# Patient Record
Sex: FEMALE | Race: WHITE | NOT HISPANIC OR LATINO | Employment: UNEMPLOYED | URBAN - METROPOLITAN AREA
[De-identification: names, ages, dates, MRNs, and addresses within clinical notes are randomized per-mention and may not be internally consistent; named-entity substitution may affect disease eponyms.]

---

## 2019-09-09 ENCOUNTER — OFFICE VISIT (OUTPATIENT)
Dept: OBSTETRICS AND GYNECOLOGY | Facility: CLINIC | Age: 39
End: 2019-09-09

## 2019-09-09 VITALS
BODY MASS INDEX: 29.26 KG/M2 | DIASTOLIC BLOOD PRESSURE: 78 MMHG | SYSTOLIC BLOOD PRESSURE: 110 MMHG | HEIGHT: 72 IN | WEIGHT: 216 LBS

## 2019-09-09 DIAGNOSIS — Z01.419 VISIT FOR GYNECOLOGIC EXAMINATION: ICD-10-CM

## 2019-09-09 DIAGNOSIS — L68.0 FEMALE HIRSUTISM: ICD-10-CM

## 2019-09-09 DIAGNOSIS — N93.8 DYSFUNCTIONAL UTERINE BLEEDING: ICD-10-CM

## 2019-09-09 DIAGNOSIS — Z01.419 PAP TEST, AS PART OF ROUTINE GYNECOLOGICAL EXAMINATION: Primary | ICD-10-CM

## 2019-09-09 PROCEDURE — 99385 PREV VISIT NEW AGE 18-39: CPT | Performed by: OBSTETRICS & GYNECOLOGY

## 2019-09-09 RX ORDER — LEFLUNOMIDE 20 MG/1
20 TABLET ORAL DAILY
Refills: 0 | COMMUNITY
Start: 2019-06-10

## 2019-09-09 RX ORDER — ERYTHROMYCIN 5 MG/G
OINTMENT OPHTHALMIC DAILY
COMMUNITY
Start: 2017-07-05

## 2019-09-09 RX ORDER — VENLAFAXINE HYDROCHLORIDE 150 MG/1
CAPSULE, EXTENDED RELEASE ORAL
Refills: 0 | COMMUNITY
Start: 2019-08-17 | End: 2022-04-29 | Stop reason: SDUPTHER

## 2019-09-09 RX ORDER — ERGOCALCIFEROL 1.25 MG/1
CAPSULE ORAL
COMMUNITY
Start: 2017-05-08

## 2019-09-09 RX ORDER — GABAPENTIN 300 MG/1
CAPSULE ORAL
Refills: 1 | COMMUNITY
Start: 2019-08-29 | End: 2022-04-29 | Stop reason: SDUPTHER

## 2019-09-09 RX ORDER — CLONAZEPAM 1 MG/1
TABLET ORAL
Refills: 0 | COMMUNITY
Start: 2019-08-02

## 2019-09-09 RX ORDER — IBUPROFEN 200 MG
200 TABLET ORAL
COMMUNITY
End: 2022-04-29 | Stop reason: SDUPTHER

## 2019-09-09 RX ORDER — LAMOTRIGINE 200 MG/1
TABLET ORAL
Refills: 1 | COMMUNITY
Start: 2019-08-17

## 2019-09-09 RX ORDER — DEXTROAMPHETAMINE SACCHARATE, AMPHETAMINE ASPARTATE, DEXTROAMPHETAMINE SULFATE AND AMPHETAMINE SULFATE 7.5; 7.5; 7.5; 7.5 MG/1; MG/1; MG/1; MG/1
TABLET ORAL
Refills: 0 | COMMUNITY
Start: 2019-08-18 | End: 2022-04-29 | Stop reason: SDUPTHER

## 2019-09-09 RX ORDER — TOPIRAMATE 100 MG/1
TABLET, FILM COATED ORAL
Refills: 1 | COMMUNITY
Start: 2019-08-17

## 2019-09-09 RX ORDER — LEVOTHYROXINE SODIUM 0.03 MG/1
TABLET ORAL
Refills: 1 | COMMUNITY
Start: 2019-07-14

## 2019-09-09 NOTE — PROGRESS NOTES
"[facility]  [address] [facility], [address]  [place], [place] [address]  Phone: [phone] / Fax:  [phone]      09/09/2019    [address] [facility] [address]    Ángel Esteves MD    Chief Complaint   Patient presents with   • Gynecologic Exam     here for annual exam. Wants to discuss irregular menstrual cycles.  Endocrine dx with PCOS.       Deedee Lang is here for annual gynecologic exam.  HPI - Patient with irregular periods and acne issues.  Her endocrinologist has suggested that she might have PCOS.  She has not had a formal work up.  Patient had tubal ligation for contraception.  Her periods vary and she generally has a period every 1 to 2 months.  She uses 6 pads at most on her heaviest.    History reviewed. No pertinent past medical history.    History reviewed. No pertinent surgical history.    Allergies   Allergen Reactions   • Codeine Nausea Only   • Gluten Meal Other (See Comments)     BLOATING AND DIARRHEA       Social History     Socioeconomic History   • Marital status: Single     Spouse name: Not on file   • Number of children: Not on file   • Years of education: Not on file   • Highest education level: Not on file       History reviewed. No pertinent family history.    Patient's last menstrual period was 08/12/2019 (approximate).    OB History     No data available          Vitals:    09/09/19 1255   BP: 110/78   Weight: 98 kg (216 lb)   Height: 208.3 cm (82\")       Physical Exam   Constitutional: She appears well-developed and well-nourished.   Genitourinary: Vagina normal and uterus normal. Pelvic exam was performed with patient supine. There is no tenderness or lesion on the right labia. There is no tenderness or lesion on the left labia. Right adnexum does not display tenderness and does not display fullness. Left adnexum does not display tenderness and does not display fullness. Cervix does not exhibit motion tenderness or lesion.   HENT:   Right Ear: External ear normal. " "  Left Ear: External ear normal.   Nose: Nose normal.   Eyes: Conjunctivae are normal.   Neck: Normal range of motion. Neck supple. No thyromegaly present.   Cardiovascular: Normal rate, regular rhythm and normal heart sounds.   Pulmonary/Chest: Effort normal. No stridor. She has no wheezes. Right breast exhibits no mass and no nipple discharge. Left breast exhibits no mass and no nipple discharge.   Abdominal: Soft. There is no tenderness. There is no guarding.   Musculoskeletal: Normal range of motion. She exhibits no edema.   Neurological: She is alert. Coordination normal.   Skin: Skin is warm and dry.   Psychiatric: She has a normal mood and affect. Her behavior is normal. Judgment and thought content normal.   Vitals reviewed.      Deedee was seen today for gynecologic exam.    Diagnoses and all orders for this visit:    Pap test, as part of routine gynecological examination/Visit for gynecologic examination  -     IGP, Apt HPV,rfx 16 / 18,45  -     Discussed importance of regular screening and breast awareness.    Dysfunctional uterine bleeding        -     I discussed with patient how diagnosis of PCOS is arrived.  For now, her diagnosis is DUB.  Will do work up.  Given her irregular cycles and \"complexion\" issues, discussed trial of OCP.        -     Patient to do sonogram and labs.      Percy Mcintyre MD      "

## 2019-09-10 LAB
FSH SERPL-ACNC: 4.7 MIU/ML
LH SERPL-ACNC: 10 MIU/ML
PROLACTIN SERPL-MCNC: 39.6 NG/ML (ref 4.8–23.3)
TESTOST SERPL-MCNC: 33 NG/DL (ref 8–48)

## 2019-09-12 ENCOUNTER — TELEPHONE (OUTPATIENT)
Dept: OBSTETRICS AND GYNECOLOGY | Facility: CLINIC | Age: 39
End: 2019-09-12

## 2019-09-12 LAB
CYTOLOGIST CVX/VAG CYTO: NORMAL
CYTOLOGY CVX/VAG DOC CYTO: NORMAL
CYTOLOGY CVX/VAG DOC THIN PREP: NORMAL
DX ICD CODE: NORMAL
HIV 1 & 2 AB SER-IMP: NORMAL
HPV I/H RISK 4 DNA CVX QL PROBE+SIG AMP: NEGATIVE
OTHER STN SPEC: NORMAL
STAT OF ADQ CVX/VAG CYTO-IMP: NORMAL

## 2019-09-12 NOTE — TELEPHONE ENCOUNTER
----- Message from Percy Mcintyre MD sent at 9/11/2019  7:04 PM EDT -----  Jojo - let her know that her labs look normal.  I would like her to do an ultrasound on the day she returns for follow up - September 30th.  Thanks.

## 2019-09-16 ENCOUNTER — TELEPHONE (OUTPATIENT)
Dept: OBSTETRICS AND GYNECOLOGY | Facility: CLINIC | Age: 39
End: 2019-09-16

## 2019-09-16 NOTE — TELEPHONE ENCOUNTER
Left message.                    Left message for patient to call back. 9-16-19/lw  ----- Message from Percy Mcintyre MD sent at 9/12/2019 11:16 AM EDT -----  LAW - Let her know that her pap was normal

## 2019-09-16 NOTE — TELEPHONE ENCOUNTER
Pt called and stated she had discussed with you about going on OCP to help her periods. She said she is bleeding really bad and has some clotting. This is her 3rd day. She said if anything gets called in for her send it to [id number] - [place], [place] - [address] AT [facility] - [phone]  - [phone] FX.       Thank you

## 2019-09-30 ENCOUNTER — PROCEDURE VISIT (OUTPATIENT)
Dept: OBSTETRICS AND GYNECOLOGY | Facility: CLINIC | Age: 39
End: 2019-09-30

## 2019-09-30 ENCOUNTER — OFFICE VISIT (OUTPATIENT)
Dept: OBSTETRICS AND GYNECOLOGY | Facility: CLINIC | Age: 39
End: 2019-09-30

## 2019-09-30 VITALS
WEIGHT: 216 LBS | BODY MASS INDEX: 40.78 KG/M2 | DIASTOLIC BLOOD PRESSURE: 76 MMHG | HEIGHT: 61 IN | SYSTOLIC BLOOD PRESSURE: 118 MMHG

## 2019-09-30 DIAGNOSIS — N93.8 DYSFUNCTIONAL UTERINE BLEEDING: Primary | ICD-10-CM

## 2019-09-30 PROCEDURE — 76830 TRANSVAGINAL US NON-OB: CPT | Performed by: OBSTETRICS & GYNECOLOGY

## 2019-09-30 PROCEDURE — 99213 OFFICE O/P EST LOW 20 MIN: CPT | Performed by: OBSTETRICS & GYNECOLOGY

## 2019-09-30 RX ORDER — NORETHINDRONE ACETATE AND ETHINYL ESTRADIOL 1MG-20(21)
1 KIT ORAL DAILY
Qty: 28 TABLET | Refills: 12 | Status: SHIPPED | OUTPATIENT
Start: 2019-09-30 | End: 2020-09-29

## 2019-09-30 NOTE — PROGRESS NOTES
Subjective   Deedee Lang is a 39 y.o. female.     Cc:  Bleeding and facial hair growth    History of Present Illness - 39 year old female who presents for follow up.  She has irregular bleeding and facial hair growth.  Her labs including CBC, FSH, LH and testosterone were normal.  Ultrasound was normal.  Patient does not have evidence of PCOS.  She is interested in therapeutic interventions.    The following portions of the patient's history were reviewed and updated as appropriate:   She  has no past medical history on file.  She  reports that she has never smoked. She does not have any smokeless tobacco history on file. Her alcohol and drug histories are not on file.  Current Outpatient Medications   Medication Sig Dispense Refill   • amphetamine-dextroamphetamine (ADDERALL) 30 MG tablet   0   • clonazePAM (KlonoPIN) 1 MG tablet TK 1 T PO BID PRN  0   • erythromycin (ROMYCIN) 5 MG/GM ophthalmic ointment Inject  into the eye Daily.     • gabapentin (NEURONTIN) 300 MG capsule   1   • ibuprofen (ADVIL,MOTRIN) 200 MG tablet Take 200 mg by mouth.     • lamoTRIgine (LaMICtal) 200 MG tablet TK 2 TS QAM AND 1 T PO QHS.  1   • leflunomide (ARAVA) 20 MG tablet Take 20 mg by mouth Daily.  0   • levothyroxine (SYNTHROID, LEVOTHROID) 25 MCG tablet TAKE 1 TABLET BY MOUTH QD.  1   • norethindrone-ethinyl estradiol FE (JUNEL FE 1/20) 1-20 MG-MCG per tablet Take 1 tablet by mouth Daily. 28 tablet 12   • topiramate (TOPAMAX) 100 MG tablet TAKE 1 TABLET BY MOUTH BID.  1   • venlafaxine XR (EFFEXOR-XR) 150 MG 24 hr capsule TK 2 CS PO QHS  0   • vitamin D (ERGOCALCIFEROL) 22849 units capsule capsule        No current facility-administered medications for this visit.      She is allergic to codeine and gluten meal..    Review of Systems   Constitutional: Negative for chills and fever.   Genitourinary: Positive for menstrual problem. Negative for pelvic pain.   Skin: Negative for pallor and rash.       Objective   Physical Exam    Constitutional: She appears well-developed and well-nourished.   Psychiatric: She has a normal mood and affect. Her behavior is normal. Judgment and thought content normal.   Vitals reviewed.      Assessment/Plan   Deedee was seen today for follow-up.    Diagnoses and all orders for this visit:    Dysfunctional uterine bleeding  -     norethindrone-ethinyl estradiol FE (JUNEL FE 1/20) 1-20 MG-MCG per tablet; Take 1 tablet by mouth Daily.  -     Patient to regulate cycle with OCP.  This should also help with complexion.  Discussed starting of OCP and compliance.  Follow up in one year.    Percy Mcintyre MD

## 2021-03-31 ENCOUNTER — BULK ORDERING (OUTPATIENT)
Dept: CASE MANAGEMENT | Facility: OTHER | Age: 41
End: 2021-03-31

## 2021-03-31 DIAGNOSIS — Z23 IMMUNIZATION DUE: ICD-10-CM

## 2022-04-29 ENCOUNTER — APPOINTMENT (OUTPATIENT)
Dept: WOMENS IMAGING | Facility: HOSPITAL | Age: 42
End: 2022-04-29

## 2022-04-29 ENCOUNTER — OFFICE VISIT (OUTPATIENT)
Dept: OBSTETRICS AND GYNECOLOGY | Facility: CLINIC | Age: 42
End: 2022-04-29

## 2022-04-29 ENCOUNTER — PROCEDURE VISIT (OUTPATIENT)
Dept: OBSTETRICS AND GYNECOLOGY | Facility: CLINIC | Age: 42
End: 2022-04-29

## 2022-04-29 VITALS
DIASTOLIC BLOOD PRESSURE: 77 MMHG | HEIGHT: 70 IN | WEIGHT: 225 LBS | BODY MASS INDEX: 32.21 KG/M2 | SYSTOLIC BLOOD PRESSURE: 104 MMHG

## 2022-04-29 DIAGNOSIS — Z01.419 VISIT FOR GYNECOLOGIC EXAMINATION: Primary | ICD-10-CM

## 2022-04-29 DIAGNOSIS — N94.6 DYSMENORRHEA: ICD-10-CM

## 2022-04-29 DIAGNOSIS — Z12.31 VISIT FOR SCREENING MAMMOGRAM: Primary | ICD-10-CM

## 2022-04-29 DIAGNOSIS — N92.0 MENORRHAGIA WITH REGULAR CYCLE: ICD-10-CM

## 2022-04-29 PROCEDURE — 77063 BREAST TOMOSYNTHESIS BI: CPT | Performed by: OBSTETRICS & GYNECOLOGY

## 2022-04-29 PROCEDURE — 77063 BREAST TOMOSYNTHESIS BI: CPT | Performed by: RADIOLOGY

## 2022-04-29 PROCEDURE — 77067 SCR MAMMO BI INCL CAD: CPT | Performed by: RADIOLOGY

## 2022-04-29 PROCEDURE — 99212 OFFICE O/P EST SF 10 MIN: CPT | Performed by: OBSTETRICS & GYNECOLOGY

## 2022-04-29 PROCEDURE — 99396 PREV VISIT EST AGE 40-64: CPT | Performed by: OBSTETRICS & GYNECOLOGY

## 2022-04-29 PROCEDURE — 77067 SCR MAMMO BI INCL CAD: CPT | Performed by: OBSTETRICS & GYNECOLOGY

## 2022-04-29 RX ORDER — PILOCARPINE HYDROCHLORIDE 5 MG/1
5 TABLET, FILM COATED ORAL 3 TIMES DAILY
COMMUNITY
Start: 2022-01-31

## 2022-04-29 RX ORDER — IBUPROFEN 200 MG
800 TABLET ORAL EVERY 8 HOURS PRN
Qty: 30 TABLET | Refills: 2 | Status: SHIPPED | OUTPATIENT
Start: 2022-04-29

## 2022-04-29 RX ORDER — DEXTROAMPHETAMINE SACCHARATE, AMPHETAMINE ASPARTATE MONOHYDRATE, DEXTROAMPHETAMINE SULFATE AND AMPHETAMINE SULFATE 7.5; 7.5; 7.5; 7.5 MG/1; MG/1; MG/1; MG/1
30 CAPSULE, EXTENDED RELEASE ORAL 2 TIMES DAILY
COMMUNITY
Start: 2022-02-14

## 2022-04-29 RX ORDER — VENLAFAXINE HYDROCHLORIDE 150 MG/1
150 CAPSULE, EXTENDED RELEASE ORAL DAILY
COMMUNITY

## 2022-04-29 RX ORDER — BUPROPION HYDROCHLORIDE 150 MG/1
150 TABLET ORAL EVERY MORNING
COMMUNITY
Start: 2022-02-21

## 2022-04-29 RX ORDER — VIT C/B6/B5/MAGNESIUM/HERB 173 50-5-6-5MG
CAPSULE ORAL
COMMUNITY

## 2022-04-30 LAB
ERYTHROCYTE [DISTWIDTH] IN BLOOD BY AUTOMATED COUNT: 12.3 % (ref 11.7–15.4)
FSH SERPL-ACNC: 5.7 MIU/ML
HCT VFR BLD AUTO: 42.2 % (ref 34–46.6)
HGB BLD-MCNC: 14.1 G/DL (ref 11.1–15.9)
MCH RBC QN AUTO: 30.2 PG (ref 26.6–33)
MCHC RBC AUTO-ENTMCNC: 33.4 G/DL (ref 31.5–35.7)
MCV RBC AUTO: 90 FL (ref 79–97)
PLATELET # BLD AUTO: 318 X10E3/UL (ref 150–450)
PROLACTIN SERPL-MCNC: 59.4 NG/ML (ref 4.8–23.3)
RBC # BLD AUTO: 4.67 X10E6/UL (ref 3.77–5.28)
TSH SERPL DL<=0.005 MIU/L-ACNC: 2.18 UIU/ML (ref 0.45–4.5)
WBC # BLD AUTO: 6.6 X10E3/UL (ref 3.4–10.8)

## 2022-04-30 NOTE — PROGRESS NOTES
[facility]  [address], [address]  [place], [place] [address]  Phone: [phone] / Fax:  [phone]      2022    [address] [facility] [address]    Aniya Garcia APRN    Chief Complaint   Patient presents with   • Gynecologic Exam      Annual Exam, last pap 19 NL HPV (-). Mammogram today. Patient c/o heavy painful cycles. She is requesting 800 mg of Ibuprofen. She was taking Gabapentin and was not help.          Deedee Lang is here for annual gynecologic exam.  HPI - Patient with last pap 2.5 years ago.  She is undergoing mammogram today.  Patient reports ongoing heavy and painful periods.  She uses 8 pads on heaviest day and soils undergarments.  She tried OCP in past without success but this has been several years ago and for short course.  She has tried NSAIDs and Neurontin for pain.  She has moderate to severe dysmenorrhea; however, heavy periods are her bigger concern.    Past Medical History:   Diagnosis Date   • Abnormal Pap smear of cervix    • ADHD    • Anxiety    • Bipolar disorder (HCC)    • COVID-19 2021   • Depression    • Disease of thyroid gland    • PMDD (premenstrual dysphoric disorder)    • Sjogren's syndrome (HCC)        Past Surgical History:   Procedure Laterality Date   • APPENDECTOMY     • BREAST AUGMENTATION     •  SECTION         Allergies   Allergen Reactions   • Codeine Nausea Only   • Gluten Meal Other (See Comments)     BLOATING AND DIARRHEA       Social History     Socioeconomic History   • Marital status: Single   Tobacco Use   • Smoking status: Never Smoker   Vaping Use   • Vaping Use: Never used   Substance and Sexual Activity   • Alcohol use: Not Currently   • Drug use: Never   • Sexual activity: Yes     Birth control/protection: None       Family History   Problem Relation Age of Onset   • Diabetes Father    • Depression Father    • Depression Mother    • Dystonia Mother    • Bone cancer Paternal Grandfather    • Colon cancer  "Paternal Grandmother    • Muscular dystrophy Maternal Grandmother    • Alzheimer's disease Maternal Grandmother    • Muscular dystrophy Maternal Grandfather        Patient's last menstrual period was 2022 (exact date).    OB History        1    Para        Term                AB        Living           SAB        IAB        Ectopic        Molar        Multiple        Live Births   1                Vitals:    22 1415   BP: 104/77   Weight: 102 kg (225 lb)   Height: 177.8 cm (70\")       Physical Exam  Constitutional:       Appearance: Normal appearance. She is well-developed.   Genitourinary:      Bladder and urethral meatus normal.      Right Labia: No tenderness or lesions.     Left Labia: No tenderness or lesions.     No vaginal discharge or tenderness.        Right Adnexa: not tender and not full.     Left Adnexa: not tender and not full.     No cervical motion tenderness or lesion.      Uterus is not enlarged or tender.      No urethral tenderness or hypermobility present.   Breasts:      Right: No mass or nipple discharge.      Left: No mass or nipple discharge.       HENT:      Right Ear: External ear normal.      Left Ear: External ear normal.      Nose: Nose normal.   Eyes:      Conjunctiva/sclera: Conjunctivae normal.   Neck:      Thyroid: No thyromegaly.   Cardiovascular:      Rate and Rhythm: Normal rate and regular rhythm.      Heart sounds: Normal heart sounds.   Pulmonary:      Effort: Pulmonary effort is normal.      Breath sounds: No stridor. No wheezing.   Abdominal:      Palpations: Abdomen is soft. There is no mass.      Tenderness: There is no guarding or rebound.   Musculoskeletal:         General: Normal range of motion.      Cervical back: Normal range of motion and neck supple.   Neurological:      Mental Status: She is alert.      Coordination: Coordination normal.   Skin:     General: Skin is warm and dry.   Psychiatric:         Mood and Affect: Mood normal.         " Behavior: Behavior normal.         Thought Content: Thought content normal.         Judgment: Judgment normal.   Vitals reviewed. Exam conducted with a chaperone present.         Diagnoses and all orders for this visit:    1. Visit for gynecologic examination (Primary)        -      Discussed importance of regular screening and breast awareness.        -      Patient has been vaccinated against Covid 19.  2. Menorrhagia with regular cycle/Dysmenorrhea  -     US Non-ob Transvaginal; Future  -     CBC (No Diff)  -     TSH  -     Follicle Stimulating Hormone  -     Prolactin  -     Patient to do work up.  Discussed revisiting medical therapy.  Discussed surgical endeavors such as ablation and hysterectomy.      Percy Mcintyre MD

## 2022-05-04 ENCOUNTER — TELEPHONE (OUTPATIENT)
Dept: OBSTETRICS AND GYNECOLOGY | Facility: CLINIC | Age: 42
End: 2022-05-04

## 2022-05-04 DIAGNOSIS — R79.89 ELEVATED PROLACTIN LEVEL: Primary | ICD-10-CM

## 2022-05-04 NOTE — TELEPHONE ENCOUNTER
Kia    Can you set the patient up for pelvic ultrasound for menorrhagia and follow up with me in 2 to 4 weeks?    I also put in a consult with endocrinology.  Please arrange with next available in [facility] system.    Thanks    Francisco Javier

## 2022-05-04 NOTE — TELEPHONE ENCOUNTER
Rescheduled pt's appt's from 5/9/22 to 5/23/2022-left voicemail and sent [employer] to inform pt.     Pt also informed she will receive a call from [facility] after they review her records.  She has their phone number incase she does not hear from [phone].

## 2022-05-10 ENCOUNTER — TELEPHONE (OUTPATIENT)
Dept: OBSTETRICS AND GYNECOLOGY | Facility: CLINIC | Age: 42
End: 2022-05-10

## 2022-05-10 DIAGNOSIS — R59.9 ENLARGED LYMPH NODE: ICD-10-CM

## 2022-05-10 DIAGNOSIS — R92.8 ABNORMALITY OF RIGHT BREAST ON SCREENING MAMMOGRAM: Primary | ICD-10-CM

## 2022-05-10 NOTE — TELEPHONE ENCOUNTER
LM for pt to call about appt scheduled at [facility] 5/25/22 @ 130pm for DX RT Mammo & RT Limited US suggested from her screening mammogram.  SR

## 2022-05-10 NOTE — TELEPHONE ENCOUNTER
Hi Dr. Mcintyre,  Pt calling back to discuss mammo results, but you had already left. She is aware that it will be tomorrow before she hears back.  Thank you,  Radha

## 2022-05-19 NOTE — TELEPHONE ENCOUNTER
Spoke with pt and explained that it was an enlarged lymph node that they wanted to take another look at from her screening mammogram. She is good to go on 5/25/22 at [facility].  SR

## 2022-05-23 ENCOUNTER — OFFICE VISIT (OUTPATIENT)
Dept: OBSTETRICS AND GYNECOLOGY | Facility: CLINIC | Age: 42
End: 2022-05-23

## 2022-05-23 VITALS
DIASTOLIC BLOOD PRESSURE: 70 MMHG | HEIGHT: 70 IN | SYSTOLIC BLOOD PRESSURE: 118 MMHG | WEIGHT: 227 LBS | BODY MASS INDEX: 32.5 KG/M2

## 2022-05-23 DIAGNOSIS — E22.1 HYPERPROLACTINEMIA: ICD-10-CM

## 2022-05-23 DIAGNOSIS — R68.82 LOW LIBIDO: ICD-10-CM

## 2022-05-23 DIAGNOSIS — N92.0 MENORRHAGIA WITH REGULAR CYCLE: Primary | ICD-10-CM

## 2022-05-23 PROCEDURE — 99214 OFFICE O/P EST MOD 30 MIN: CPT | Performed by: OBSTETRICS & GYNECOLOGY

## 2022-05-24 NOTE — PROGRESS NOTES
"Subjective   Deedee Lang is a 42 y.o. female.     Cc:  Follow up to cycles    History of Present Illness - Patient is a 42 year old female who presents for follow up.  The following issues exist:  1)  Menorrhagia.  Patient with ongoing heavy periods with soiling of clothes.  Patient reports having tried OCP in remote past with \"side effects\" and an overall aversion/dislike to taking hormonal medication.  Work up including CBC, TSH, FSH, prolactin and ultrasound were unremarkable except for elevated Prolactin level.  2)  Elevated Prolactin.  Patient without known cause of elevated Prolactin with possible increase due to psychotropic drugs versus idiopathic cause.  Prolactin level increased from 39 to 59 over past several years.  3)  Low libido.  Patient presents with partner and she describes low sexual desire.    The following portions of the patient's history were reviewed and updated as appropriate:   She  has a past medical history of Abnormal Pap smear of cervix, ADHD, Anxiety, Bipolar disorder ([facility]), COVID-19 (11/2021), Depression, Disease of thyroid gland, PMDD (premenstrual dysphoric disorder), and Sjogren's syndrome ([facility]).  She  reports that she has never smoked. She does not have any smokeless tobacco history on file. She reports previous alcohol use. She reports that she does not use drugs.  Current Outpatient Medications   Medication Sig Dispense Refill   • amphetamine-dextroamphetamine XR (ADDERALL XR) 30 MG 24 hr capsule Take 30 mg by mouth 2 (Two) Times a Day     • buPROPion XL (WELLBUTRIN XL) 150 MG 24 hr tablet Take 150 mg by mouth Every Morning.     • clonazePAM (KlonoPIN) 1 MG tablet TK 1 T PO BID PRN  0   • Emollient (Batsheva Retinol Correxion) cream Apply  topically to the appropriate area as directed.     • erythromycin (ROMYCIN) 5 MG/GM ophthalmic ointment Inject  into the eye Daily.     • ibuprofen (ADVIL,MOTRIN) 200 MG tablet Take 4 tablets by mouth Every 8 (Eight) Hours As Needed for Mild " Pain . 30 tablet 2   • lamoTRIgine (LaMICtal) 200 MG tablet TK 2 TS QAM AND 1 T PO QHS.  1   • leflunomide (ARAVA) 20 MG tablet Take 20 mg by mouth Daily.  0   • levothyroxine (SYNTHROID, LEVOTHROID) 25 MCG tablet TAKE 1 TABLET BY MOUTH QD.  1   • pilocarpine (SALAGEN) 5 MG tablet Take 5 mg by mouth 3 (Three) Times a Day.     • topiramate (TOPAMAX) 100 MG tablet TAKE 1 TABLET BY MOUTH BID.  1   • Turmeric 500 MG capsule Take  by mouth.     • venlafaxine XR (EFFEXOR-XR) 150 MG 24 hr capsule Take 150 mg by mouth Daily.     • vitamin D (ERGOCALCIFEROL) 89063 units capsule capsule        No current facility-administered medications for this visit.     She is allergic to codeine and gluten meal..    Review of Systems   Constitutional: Negative for chills and fever.   Genitourinary: Positive for menstrual problem. Negative for vaginal discharge.       Objective   Physical Exam  Vitals reviewed. Exam conducted with a chaperone present.   Constitutional:       Appearance: Normal appearance.   Neurological:      Mental Status: She is alert.   Psychiatric:         Mood and Affect: Mood normal.         Behavior: Behavior normal.         Thought Content: Thought content normal.         Judgment: Judgment normal.         Assessment & Plan   Diagnoses and all orders for this visit:    1. Menorrhagia with regular cycle (Primary)       -      Patient with normal labs except borderline Prolactin level and normal sonogram.  Options include endometrial ablation versus medication.  Discussed low dose OCP for treatment or Progestin-only medication.  Also discussed ablation, nature of procedure, success rate and risks of failure.  Patient voices understanding.  Pamphlets given.  2. Hyperprolactinemia (HCC)  -     Ambulatory Referral to Endocrinology.  -     Elevated Prolactin.  Refer to endocrine.  Discussed etiologies.  Majority are idiopathic when Prolactin levels are less than 100.  3. Low libido         -     Patient is on multiple  psychotropic drugs which can suppress libido.      Percy Mcnityre MD

## 2022-05-27 NOTE — TELEPHONE ENCOUNTER
Pt failed to keep appt at [facility] on 5/25/22.  I left message with phone number to [facility] to call and reschedule.  I also left our number in case she needed assistance.  SR

## 2022-06-07 ENCOUNTER — TELEPHONE (OUTPATIENT)
Dept: OBSTETRICS AND GYNECOLOGY | Facility: CLINIC | Age: 42
End: 2022-06-07

## 2022-06-07 NOTE — TELEPHONE ENCOUNTER
LAW    Can you call her sometime this week and find out if she is planning to do mammogram follow up?  I think Martha is having some issues reaching her.  Let her know that if she would like to talk anything through with me, I can do so.    Thanks    Francisco Javier

## 2022-06-08 NOTE — TELEPHONE ENCOUNTER
Left message for patient to call back and mailed a certified letter. 6-13-22/lw  Left message for patient to call back. 6-9-22/lw  Left message for patient to call back. 6-8-22/lw

## 2022-06-16 ENCOUNTER — TELEPHONE (OUTPATIENT)
Dept: OBSTETRICS AND GYNECOLOGY | Facility: CLINIC | Age: 42
End: 2022-06-16

## 2022-06-16 NOTE — TELEPHONE ENCOUNTER
It looks as if office has been trying to get a hold of patient for awhile, pt called back and I relayed that Dr. Mcintyre is wanting to know if pt was planning to do a mammo follow up.     Pt says she's confused and thought she was supposed to have mammo follow up at last appt.     Is this something you can handle?    Thank you!

## 2022-06-17 NOTE — TELEPHONE ENCOUNTER
Per [facility] pt has rescheduled her follow up appt for 6/29/22 @ 3pm.  She will be having a DX RT Mammo & RT Limited US.  SR

## 2022-07-12 ENCOUNTER — TELEPHONE (OUTPATIENT)
Dept: OBSTETRICS AND GYNECOLOGY | Facility: CLINIC | Age: 42
End: 2022-07-12

## 2022-07-12 NOTE — TELEPHONE ENCOUNTER
Martha    Did she complete her follow up?    It was supposed to occur at end of June.    Let me know.    Thanks    Francisco Javier

## 2022-07-13 ENCOUNTER — APPOINTMENT (OUTPATIENT)
Dept: WOMENS IMAGING | Facility: HOSPITAL | Age: 42
End: 2022-07-13

## 2022-07-13 PROCEDURE — 77065 DX MAMMO INCL CAD UNI: CPT | Performed by: RADIOLOGY

## 2022-07-13 PROCEDURE — 77061 BREAST TOMOSYNTHESIS UNI: CPT | Performed by: RADIOLOGY

## 2022-07-13 PROCEDURE — 76642 ULTRASOUND BREAST LIMITED: CPT | Performed by: RADIOLOGY

## 2022-07-13 PROCEDURE — G0279 TOMOSYNTHESIS, MAMMO: HCPCS | Performed by: RADIOLOGY

## 2022-07-14 NOTE — TELEPHONE ENCOUNTER
Pt no showed appt on 7/13/22.  She has had 3 appts scheduled but not kept any of them.  Lily & ZHANNA have both had to leave five messages for pt to call about scheduling.  Certified letter has been mailed as well.  SR

## 2022-07-15 DIAGNOSIS — R92.8 ABNORMALITY OF RIGHT BREAST ON SCREENING MAMMOGRAM: ICD-10-CM

## 2022-07-15 DIAGNOSIS — R59.9 ENLARGED LYMPH NODE: ICD-10-CM

## 2022-07-17 NOTE — TELEPHONE ENCOUNTER
Martha    Testing looks stable and follow up recommended in 6 months.    Thanks    Francisco Javier

## 2022-07-19 ENCOUNTER — TELEPHONE (OUTPATIENT)
Dept: OBSTETRICS AND GYNECOLOGY | Facility: CLINIC | Age: 42
End: 2022-07-19

## 2022-07-28 ENCOUNTER — TELEPHONE (OUTPATIENT)
Dept: OBSTETRICS AND GYNECOLOGY | Facility: CLINIC | Age: 42
End: 2022-07-28

## 2022-07-28 NOTE — TELEPHONE ENCOUNTER
Left message for Deedee that Dr Mcintyre received the report from [facility] with report stating benign-negative results. They recommend you follow up in 6 months, 01/2023. Please call [facility] at [phone] to schedule your appt. You will be receiving a copy of the letter from [facility]. Thank you.

## 2023-01-26 DIAGNOSIS — N63.10 MASS OF RIGHT BREAST, UNSPECIFIED QUADRANT: ICD-10-CM

## 2023-01-26 DIAGNOSIS — Z09 FOLLOW-UP EXAM, 3-6 MONTHS SINCE PREVIOUS EXAM: Primary | ICD-10-CM

## 2023-01-31 ENCOUNTER — APPOINTMENT (OUTPATIENT)
Dept: WOMENS IMAGING | Facility: HOSPITAL | Age: 43
End: 2023-01-31
Payer: COMMERCIAL

## 2023-01-31 PROCEDURE — 76642 ULTRASOUND BREAST LIMITED: CPT | Performed by: RADIOLOGY

## 2023-01-31 PROCEDURE — 77065 DX MAMMO INCL CAD UNI: CPT | Performed by: RADIOLOGY

## 2023-01-31 PROCEDURE — 77061 BREAST TOMOSYNTHESIS UNI: CPT | Performed by: RADIOLOGY

## 2023-01-31 PROCEDURE — G0279 TOMOSYNTHESIS, MAMMO: HCPCS | Performed by: RADIOLOGY

## 2023-02-06 DIAGNOSIS — N63.10 MASS OF RIGHT BREAST, UNSPECIFIED QUADRANT: ICD-10-CM

## 2023-02-06 DIAGNOSIS — Z09 FOLLOW-UP EXAM, 3-6 MONTHS SINCE PREVIOUS EXAM: ICD-10-CM

## 2023-02-07 ENCOUNTER — TELEPHONE (OUTPATIENT)
Dept: OBSTETRICS AND GYNECOLOGY | Facility: CLINIC | Age: 43
End: 2023-02-07

## 2023-07-31 DIAGNOSIS — Z09 FOLLOW-UP EXAM, 3-6 MONTHS SINCE PREVIOUS EXAM: Primary | ICD-10-CM

## 2023-07-31 DIAGNOSIS — N63.10 MASS OF RIGHT BREAST, UNSPECIFIED QUADRANT: ICD-10-CM

## 2023-08-08 ENCOUNTER — TELEPHONE (OUTPATIENT)
Dept: OBSTETRICS AND GYNECOLOGY | Facility: CLINIC | Age: 43
End: 2023-08-08
Payer: COMMERCIAL

## 2023-08-08 NOTE — TELEPHONE ENCOUNTER
Pt worked with [facility] and scheduled her 6 mth follow up DX BIlat Mammo & Right Limited US for 8/14/23 @ 230 & 3pm.

## 2023-08-14 ENCOUNTER — APPOINTMENT (OUTPATIENT)
Dept: WOMENS IMAGING | Facility: HOSPITAL | Age: 43
End: 2023-08-14
Payer: COMMERCIAL

## 2023-08-14 PROCEDURE — 76642 ULTRASOUND BREAST LIMITED: CPT | Performed by: RADIOLOGY

## 2023-08-14 PROCEDURE — 77062 BREAST TOMOSYNTHESIS BI: CPT | Performed by: RADIOLOGY

## 2023-08-14 PROCEDURE — 77066 DX MAMMO INCL CAD BI: CPT | Performed by: RADIOLOGY

## 2023-08-14 PROCEDURE — G0279 TOMOSYNTHESIS, MAMMO: HCPCS | Performed by: RADIOLOGY

## 2023-08-16 DIAGNOSIS — N63.10 MASS OF RIGHT BREAST, UNSPECIFIED QUADRANT: ICD-10-CM

## 2023-08-16 DIAGNOSIS — Z09 FOLLOW-UP EXAM, 3-6 MONTHS SINCE PREVIOUS EXAM: ICD-10-CM

## 2023-10-03 ENCOUNTER — OFFICE VISIT (OUTPATIENT)
Dept: OBSTETRICS AND GYNECOLOGY | Facility: CLINIC | Age: 43
End: 2023-10-03
Payer: COMMERCIAL

## 2023-10-03 VITALS
BODY MASS INDEX: 33.07 KG/M2 | HEIGHT: 70 IN | WEIGHT: 231 LBS | SYSTOLIC BLOOD PRESSURE: 124 MMHG | DIASTOLIC BLOOD PRESSURE: 94 MMHG

## 2023-10-03 DIAGNOSIS — N93.8 DYSFUNCTIONAL UTERINE BLEEDING: Primary | ICD-10-CM

## 2023-10-03 RX ORDER — METRONIDAZOLE 500 MG/1
500 TABLET ORAL
COMMUNITY
Start: 2023-09-26 | End: 2023-10-03

## 2023-10-03 RX ORDER — SEMAGLUTIDE 1.34 MG/ML
INJECTION, SOLUTION SUBCUTANEOUS
COMMUNITY
Start: 2023-07-08

## 2023-10-03 RX ORDER — PSEUDOEPHEDRINE HCL 30 MG
200 TABLET ORAL
COMMUNITY
Start: 2023-09-18 | End: 2024-09-17

## 2023-10-04 NOTE — PROGRESS NOTES
Subjective   Deedee Lang is a 43 y.o. female.     Cc:  Bleeding    History of Present Illness - Patient is a 43 year old female who presents for evaluation of irregular bleeding.  She had a work up last year that was essentially negative; she reported an extended interval between cycles with heavy bleeding noted when those cycles occurred.  She elected to observe rather than do any therapeutic intervention.  Most recently, she began bleeding after starting Ozempic, which she is no longer on.  She reports nearly 6 weeks of daily bleeding.  She has had a normal pap within past 4 years and has been sterilized.    The following portions of the patient's history were reviewed and updated as appropriate: She  has a past medical history of Abnormal Pap smear of cervix, ADHD, Anxiety, Bipolar disorder, COVID-19 (2021), Depression, Disease of thyroid gland, PMDD (premenstrual dysphoric disorder), and Sjogren's syndrome.  She  has a past surgical history that includes Breast Augmentation; Appendectomy; and  section.  She  reports that she has never smoked. She does not have any smokeless tobacco history on file. She reports current alcohol use. She reports that she does not use drugs.  Current Outpatient Medications   Medication Sig Dispense Refill    amphetamine-dextroamphetamine XR (ADDERALL XR) 30 MG 24 hr capsule Take 1 capsule by mouth 2 (Two) Times a Day      clonazePAM (KlonoPIN) 1 MG tablet TK 1 T PO BID PRN  0    docusate sodium 100 MG capsule Take 2 capsules by mouth.      Emollient (Batsheva Retinol Correxion) cream Apply  topically to the appropriate area as directed.      ibuprofen (ADVIL,MOTRIN) 200 MG tablet Take 4 tablets by mouth Every 8 (Eight) Hours As Needed for Mild Pain . 30 tablet 2    lamoTRIgine (LaMICtal) 200 MG tablet TK 2 TS QAM AND 1 T PO QHS.  1    levothyroxine (SYNTHROID, LEVOTHROID) 25 MCG tablet TAKE 1 TABLET BY MOUTH QD.  1    metroNIDAZOLE (FLAGYL) 500 MG tablet Take 1 tablet by  mouth.      Ozempic, 1 MG/DOSE, 4 MG/3ML solution pen-injector INJECT 1MG INTO THE SKIN ONCE EVERY WEEK      pilocarpine (SALAGEN) 5 MG tablet Take 1 tablet by mouth 3 (Three) Times a Day.      Semaglutide,0.25 or 0.5MG/DOS, (OZEMPIC) 2 MG/3ML solution pen-injector Inject 0.5 mg under the skin into the appropriate area as directed Every 7 (Seven) Days.      topiramate (TOPAMAX) 100 MG tablet TAKE 1 TABLET BY MOUTH BID.  1    venlafaxine XR (EFFEXOR-XR) 150 MG 24 hr capsule Take 1 capsule by mouth Daily.      vitamin D (ERGOCALCIFEROL) 18686 units capsule capsule        No current facility-administered medications for this visit.     She is allergic to codeine and gluten meal..    Review of Systems   Constitutional:  Negative for chills and fever.   Genitourinary:  Positive for menstrual problem.     Objective   Physical Exam  Vitals reviewed. Exam conducted with a chaperone present.   Constitutional:       Appearance: Normal appearance.   Genitourinary:     General: Normal vulva.      Exam position: Lithotomy position.      Labia:         Right: No tenderness.         Left: No tenderness.       Vagina: Normal.      Cervix: Normal.      Uterus: Normal.       Adnexa: Right adnexa normal and left adnexa normal.   Neurological:      Mental Status: She is alert.       Assessment & Plan   Diagnoses and all orders for this visit:    1. Dysfunctional uterine bleeding (Primary)  -     Case Request  -     Despite bleeding, patient does not have anemia.  However, patient is symptomatic and request intervention.  Discussed observation, given she is not anemic, versus medical therapy which can control cycles without risks of surgical intervention versus surgery, primarily endometrial ablation.  She is a candidate given she is sterilized.  Discussed nature of procedure as well as risks including bleeding, infection, injury to local organs, anesthesia.  Patient voices understanding and agrees to proceed.    Percy Mcintyre,  MD

## 2023-10-12 ENCOUNTER — TELEPHONE (OUTPATIENT)
Dept: OBSTETRICS AND GYNECOLOGY | Facility: CLINIC | Age: 43
End: 2023-10-12
Payer: COMMERCIAL

## 2023-10-12 PROBLEM — N93.8 DYSFUNCTIONAL UTERINE BLEEDING: Status: ACTIVE | Noted: 2023-10-03

## 2023-10-25 ENCOUNTER — TELEPHONE (OUTPATIENT)
Dept: OBSTETRICS AND GYNECOLOGY | Facility: CLINIC | Age: 43
End: 2023-10-25
Payer: COMMERCIAL

## 2023-11-01 ENCOUNTER — TELEPHONE (OUTPATIENT)
Dept: OBSTETRICS AND GYNECOLOGY | Facility: CLINIC | Age: 43
End: 2023-11-01
Payer: COMMERCIAL

## 2023-11-02 ENCOUNTER — TRANSCRIBE ORDERS (OUTPATIENT)
Dept: OBSTETRICS AND GYNECOLOGY | Facility: CLINIC | Age: 43
End: 2023-11-02
Payer: COMMERCIAL

## 2023-11-02 DIAGNOSIS — N93.8 DYSFUNCTIONAL UTERINE BLEEDING: Primary | ICD-10-CM

## 2023-11-07 ENCOUNTER — HOSPITAL ENCOUNTER (OUTPATIENT)
Age: 43
Setting detail: HOSPITAL OUTPATIENT SURGERY
Discharge: HOME | End: 2023-11-07
Attending: OBSTETRICS & GYNECOLOGY | Admitting: OBSTETRICS & GYNECOLOGY
Payer: COMMERCIAL

## 2023-11-07 ENCOUNTER — ANESTHESIA (OUTPATIENT)
Age: 43
End: 2023-11-07
Payer: COMMERCIAL

## 2023-11-07 ENCOUNTER — ANESTHESIA EVENT (OUTPATIENT)
Age: 43
End: 2023-11-07
Payer: COMMERCIAL

## 2023-11-07 VITALS
DIASTOLIC BLOOD PRESSURE: 75 MMHG | OXYGEN SATURATION: 98 % | HEART RATE: 74 BPM | HEIGHT: 70 IN | SYSTOLIC BLOOD PRESSURE: 110 MMHG | BODY MASS INDEX: 33.07 KG/M2 | RESPIRATION RATE: 16 BRPM | WEIGHT: 231 LBS | TEMPERATURE: 97.9 F

## 2023-11-07 DIAGNOSIS — N93.8 DYSFUNCTIONAL UTERINE BLEEDING: ICD-10-CM

## 2023-11-07 LAB
B-HCG UR QL: NEGATIVE
EXPIRATION DATE: NORMAL
INTERNAL NEGATIVE CONTROL: NEGATIVE
INTERNAL POSITIVE CONTROL: POSITIVE
Lab: NORMAL

## 2023-11-07 PROCEDURE — 25010000002 ONDANSETRON PER 1 MG: Performed by: ANESTHESIOLOGY

## 2023-11-07 PROCEDURE — 25010000002 DEXAMETHASONE SODIUM PHOSPHATE 20 MG/5ML SOLUTION: Performed by: ANESTHESIOLOGY

## 2023-11-07 PROCEDURE — 88305 TISSUE EXAM BY PATHOLOGIST: CPT | Performed by: OBSTETRICS & GYNECOLOGY

## 2023-11-07 PROCEDURE — 81025 URINE PREGNANCY TEST: CPT | Performed by: ANESTHESIOLOGY

## 2023-11-07 PROCEDURE — S0260 H&P FOR SURGERY: HCPCS | Performed by: OBSTETRICS & GYNECOLOGY

## 2023-11-07 PROCEDURE — 58563 HYSTEROSCOPY ABLATION: CPT | Performed by: OBSTETRICS & GYNECOLOGY

## 2023-11-07 PROCEDURE — 25010000002 KETOROLAC TROMETHAMINE PER 15 MG: Performed by: ANESTHESIOLOGY

## 2023-11-07 PROCEDURE — 25010000002 FENTANYL CITRATE (PF) 50 MCG/ML SOLUTION: Performed by: ANESTHESIOLOGY

## 2023-11-07 PROCEDURE — 25010000002 PROPOFOL 10 MG/ML EMULSION: Performed by: ANESTHESIOLOGY

## 2023-11-07 PROCEDURE — 25810000003 LACTATED RINGERS PER 1000 ML: Performed by: ANESTHESIOLOGY

## 2023-11-07 RX ORDER — LABETALOL HYDROCHLORIDE 5 MG/ML
5 INJECTION, SOLUTION INTRAVENOUS
Status: DISCONTINUED | OUTPATIENT
Start: 2023-11-07 | End: 2023-11-07 | Stop reason: HOSPADM

## 2023-11-07 RX ORDER — SODIUM CHLORIDE 0.9 % (FLUSH) 0.9 %
3 SYRINGE (ML) INJECTION EVERY 12 HOURS SCHEDULED
Status: DISCONTINUED | OUTPATIENT
Start: 2023-11-07 | End: 2023-11-07 | Stop reason: HOSPADM

## 2023-11-07 RX ORDER — LIDOCAINE HYDROCHLORIDE 20 MG/ML
INJECTION, SOLUTION INFILTRATION; PERINEURAL AS NEEDED
Status: DISCONTINUED | OUTPATIENT
Start: 2023-11-07 | End: 2023-11-07 | Stop reason: SURG

## 2023-11-07 RX ORDER — FAMOTIDINE 10 MG/ML
20 INJECTION, SOLUTION INTRAVENOUS ONCE
Status: DISCONTINUED | OUTPATIENT
Start: 2023-11-07 | End: 2023-11-07 | Stop reason: HOSPADM

## 2023-11-07 RX ORDER — FENTANYL CITRATE 50 UG/ML
50 INJECTION, SOLUTION INTRAMUSCULAR; INTRAVENOUS
Status: DISCONTINUED | OUTPATIENT
Start: 2023-11-07 | End: 2023-11-07 | Stop reason: HOSPADM

## 2023-11-07 RX ORDER — MIDAZOLAM HYDROCHLORIDE 1 MG/ML
1 INJECTION INTRAMUSCULAR; INTRAVENOUS
Status: DISCONTINUED | OUTPATIENT
Start: 2023-11-07 | End: 2023-11-07 | Stop reason: HOSPADM

## 2023-11-07 RX ORDER — ONDANSETRON 2 MG/ML
4 INJECTION INTRAMUSCULAR; INTRAVENOUS ONCE AS NEEDED
Status: DISCONTINUED | OUTPATIENT
Start: 2023-11-07 | End: 2023-11-07 | Stop reason: HOSPADM

## 2023-11-07 RX ORDER — NALOXONE HCL 0.4 MG/ML
0.2 VIAL (ML) INJECTION AS NEEDED
Status: DISCONTINUED | OUTPATIENT
Start: 2023-11-07 | End: 2023-11-07 | Stop reason: HOSPADM

## 2023-11-07 RX ORDER — SODIUM CHLORIDE 0.9 % (FLUSH) 0.9 %
3-10 SYRINGE (ML) INJECTION AS NEEDED
Status: DISCONTINUED | OUTPATIENT
Start: 2023-11-07 | End: 2023-11-07 | Stop reason: HOSPADM

## 2023-11-07 RX ORDER — FLUMAZENIL 0.1 MG/ML
0.2 INJECTION INTRAVENOUS AS NEEDED
Status: DISCONTINUED | OUTPATIENT
Start: 2023-11-07 | End: 2023-11-07 | Stop reason: HOSPADM

## 2023-11-07 RX ORDER — DROPERIDOL 2.5 MG/ML
0.62 INJECTION, SOLUTION INTRAMUSCULAR; INTRAVENOUS
Status: DISCONTINUED | OUTPATIENT
Start: 2023-11-07 | End: 2023-11-07 | Stop reason: HOSPADM

## 2023-11-07 RX ORDER — HYDROMORPHONE HYDROCHLORIDE 1 MG/ML
0.5 INJECTION, SOLUTION INTRAMUSCULAR; INTRAVENOUS; SUBCUTANEOUS
Status: DISCONTINUED | OUTPATIENT
Start: 2023-11-07 | End: 2023-11-07 | Stop reason: HOSPADM

## 2023-11-07 RX ORDER — DEXAMETHASONE SODIUM PHOSPHATE 4 MG/ML
INJECTION, SOLUTION INTRA-ARTICULAR; INTRALESIONAL; INTRAMUSCULAR; INTRAVENOUS; SOFT TISSUE AS NEEDED
Status: DISCONTINUED | OUTPATIENT
Start: 2023-11-07 | End: 2023-11-07 | Stop reason: SURG

## 2023-11-07 RX ORDER — ONDANSETRON 2 MG/ML
INJECTION INTRAMUSCULAR; INTRAVENOUS AS NEEDED
Status: DISCONTINUED | OUTPATIENT
Start: 2023-11-07 | End: 2023-11-07 | Stop reason: SURG

## 2023-11-07 RX ORDER — PROPOFOL 10 MG/ML
VIAL (ML) INTRAVENOUS AS NEEDED
Status: DISCONTINUED | OUTPATIENT
Start: 2023-11-07 | End: 2023-11-07 | Stop reason: SURG

## 2023-11-07 RX ORDER — SODIUM CHLORIDE, SODIUM LACTATE, POTASSIUM CHLORIDE, CALCIUM CHLORIDE 600; 310; 30; 20 MG/100ML; MG/100ML; MG/100ML; MG/100ML
INJECTION, SOLUTION INTRAVENOUS CONTINUOUS PRN
Status: DISCONTINUED | OUTPATIENT
Start: 2023-11-07 | End: 2023-11-07 | Stop reason: SURG

## 2023-11-07 RX ORDER — PROMETHAZINE HYDROCHLORIDE 12.5 MG/1
25 TABLET ORAL ONCE AS NEEDED
Status: DISCONTINUED | OUTPATIENT
Start: 2023-11-07 | End: 2023-11-07 | Stop reason: HOSPADM

## 2023-11-07 RX ORDER — SODIUM CHLORIDE, SODIUM LACTATE, POTASSIUM CHLORIDE, CALCIUM CHLORIDE 600; 310; 30; 20 MG/100ML; MG/100ML; MG/100ML; MG/100ML
9 INJECTION, SOLUTION INTRAVENOUS CONTINUOUS
Status: DISCONTINUED | OUTPATIENT
Start: 2023-11-07 | End: 2023-11-07 | Stop reason: HOSPADM

## 2023-11-07 RX ORDER — KETOROLAC TROMETHAMINE 30 MG/ML
INJECTION, SOLUTION INTRAMUSCULAR; INTRAVENOUS AS NEEDED
Status: DISCONTINUED | OUTPATIENT
Start: 2023-11-07 | End: 2023-11-07 | Stop reason: SURG

## 2023-11-07 RX ORDER — FENTANYL CITRATE 50 UG/ML
50 INJECTION, SOLUTION INTRAMUSCULAR; INTRAVENOUS ONCE AS NEEDED
Status: DISCONTINUED | OUTPATIENT
Start: 2023-11-07 | End: 2023-11-07 | Stop reason: HOSPADM

## 2023-11-07 RX ORDER — HYDRALAZINE HYDROCHLORIDE 20 MG/ML
5 INJECTION INTRAMUSCULAR; INTRAVENOUS
Status: DISCONTINUED | OUTPATIENT
Start: 2023-11-07 | End: 2023-11-07 | Stop reason: HOSPADM

## 2023-11-07 RX ORDER — DIPHENHYDRAMINE HYDROCHLORIDE 50 MG/ML
12.5 INJECTION INTRAMUSCULAR; INTRAVENOUS
Status: DISCONTINUED | OUTPATIENT
Start: 2023-11-07 | End: 2023-11-07 | Stop reason: HOSPADM

## 2023-11-07 RX ORDER — TRAMADOL HYDROCHLORIDE 50 MG/1
50 TABLET ORAL EVERY 6 HOURS PRN
Qty: 6 TABLET | Refills: 0 | Status: SHIPPED | OUTPATIENT
Start: 2023-11-07 | End: 2023-11-14

## 2023-11-07 RX ORDER — PROMETHAZINE HYDROCHLORIDE 25 MG/1
25 SUPPOSITORY RECTAL ONCE AS NEEDED
Status: DISCONTINUED | OUTPATIENT
Start: 2023-11-07 | End: 2023-11-07 | Stop reason: HOSPADM

## 2023-11-07 RX ORDER — TRAMADOL HYDROCHLORIDE 50 MG/1
50 TABLET ORAL EVERY 6 HOURS PRN
Status: DISCONTINUED | OUTPATIENT
Start: 2023-11-07 | End: 2023-11-07 | Stop reason: HOSPADM

## 2023-11-07 RX ORDER — FENTANYL CITRATE 50 UG/ML
INJECTION, SOLUTION INTRAMUSCULAR; INTRAVENOUS AS NEEDED
Status: DISCONTINUED | OUTPATIENT
Start: 2023-11-07 | End: 2023-11-07 | Stop reason: SURG

## 2023-11-07 RX ADMIN — DEXAMETHASONE SODIUM PHOSPHATE 10 MG: 4 INJECTION, SOLUTION INTRAMUSCULAR; INTRAVENOUS at 07:17

## 2023-11-07 RX ADMIN — SODIUM CHLORIDE, SODIUM LACTATE, POTASSIUM CHLORIDE, AND CALCIUM CHLORIDE 9 ML/HR: .6; .31; .03; .02 INJECTION, SOLUTION INTRAVENOUS at 06:45

## 2023-11-07 RX ADMIN — ONDANSETRON 4 MG: 2 INJECTION INTRAMUSCULAR; INTRAVENOUS at 07:27

## 2023-11-07 RX ADMIN — PROPOFOL 20 MG: 10 INJECTION, EMULSION INTRAVENOUS at 07:31

## 2023-11-07 RX ADMIN — LIDOCAINE HYDROCHLORIDE 100 MG: 20 INJECTION, SOLUTION INFILTRATION; PERINEURAL at 07:12

## 2023-11-07 RX ADMIN — KETOROLAC TROMETHAMINE 30 MG: 30 INJECTION, SOLUTION INTRAMUSCULAR; INTRAVENOUS at 07:27

## 2023-11-07 RX ADMIN — SODIUM CHLORIDE, SODIUM LACTATE, POTASSIUM CHLORIDE, AND CALCIUM CHLORIDE: .6; .31; .03; .02 INJECTION, SOLUTION INTRAVENOUS at 07:09

## 2023-11-07 RX ADMIN — FENTANYL CITRATE 50 MCG: 50 INJECTION, SOLUTION INTRAMUSCULAR; INTRAVENOUS at 07:17

## 2023-11-07 RX ADMIN — PROPOFOL 180 MG: 10 INJECTION, EMULSION INTRAVENOUS at 07:12

## 2023-11-07 NOTE — H&P
[facility]   HISTORY AND PHYSICAL    Patient Name: Deedee Lang  : 1980  MRN: [id number]  Primary Care Physician:  Aniya Garcia APRN  Date of admission: 2023    Subjective   Subjective     Chief Complaint: heavy menses    History of Present Illness - Patient is a 43 year old female, multiparous, with menorrhagia.  Patient has completed childbearing and requests surgical intervention.    Review of Systems   Constitutional:  Negative for chills and fever.   Respiratory:  Negative for choking and shortness of breath.    Cardiovascular:  Negative for chest pain and palpitations.   Genitourinary:  Positive for menstrual problem.        Personal History     Past Medical History:   Diagnosis Date    Abnormal Pap smear of cervix     ADHD     Anxiety     Bipolar disorder     COVID-19 2021    Depression     Disease of thyroid gland     PMDD (premenstrual dysphoric disorder)     Sjogren's syndrome        Past Surgical History:   Procedure Laterality Date    APPENDECTOMY      BREAST AUGMENTATION       SECTION         Family History: family history includes Alzheimer's disease in her maternal grandmother; Bone cancer in her paternal grandfather; Colon cancer in her paternal grandmother; Depression in her father and mother; Diabetes in her father; Dystonia in her mother; Muscular dystrophy in her maternal grandfather and maternal grandmother. Otherwise pertinent FHx was reviewed and not pertinent to current issue.    Social History:  reports that she has never smoked. She does not have any smokeless tobacco history on file. She reports current alcohol use. She reports that she does not use drugs.    Home Medications:  Batsheva Retinol Correxion, Semaglutide (1 MG/DOSE), Semaglutide(0.25 or 0.5MG/DOS), amphetamine-dextroamphetamine XR, clonazePAM, docusate sodium, ibuprofen, lamoTRIgine, levothyroxine, pilocarpine, topiramate, venlafaxine XR, and vitamin D    Allergies:  Allergies   Allergen  Reactions    Codeine Nausea Only    Gluten Meal Other (See Comments)     BLOATING AND DIARRHEA       Objective    Objective     Vitals:   Temp:  [98 °F (36.7 °C)] 98 °F (36.7 °C)  Heart Rate:  [71] 71  Resp:  [16] 16  BP: (131)/(84) 131/84    Physical Exam  Vitals reviewed.   Constitutional:       Appearance: Normal appearance.   Cardiovascular:      Rate and Rhythm: Normal rate and regular rhythm.   Pulmonary:      Effort: Pulmonary effort is normal.   Neurological:      General: No focal deficit present.      Mental Status: She is alert.      Coordination: Coordination normal.   Psychiatric:         Mood and Affect: Mood normal.         Behavior: Behavior normal.         Thought Content: Thought content normal.         Judgment: Judgment normal.         Result Review      uCG negative.    Assessment & Plan   Assessment / Plan     Brief Patient Summary:  Deedee Lang is a 43 y.o. female with history of menorrhagia refractory to conservative measures.    Active Hospital Problems:  Active Hospital Problems    Diagnosis     **Dysfunctional uterine bleeding      Plan:   - Patient for hysteroscopy, D&C and endometrial ablation.  I discussed intent and goal of procedure.  Risks were discussed including bleeding, infection, injury to local structures, anesthetic and failure of procedure.  Patient voices understanding and agrees to proceed.   present.    DVT prophylaxis:  No DVT prophylaxis order currently exists.    CODE STATUS:       Admission Status:  I believe this patient meets outpatient status.    Percy Mcintyre MD

## 2023-11-07 NOTE — ANESTHESIA PROCEDURE NOTES
Airway  Urgency: elective    Date/Time: 11/7/2023 7:14 AM  Airway not difficult    General Information and Staff    Patient location during procedure: OR    Indications and Patient Condition  Indications for airway management: airway protection  Mask difficulty assessment: 0 - not attempted    Final Airway Details  Final airway type: supraglottic airway      Successful airway: classic  Size 4     Number of attempts at approach: 1  Assessment: lips, teeth, and gum same as pre-op and atraumatic intubation

## 2023-11-07 NOTE — ANESTHESIA PREPROCEDURE EVALUATION
Anesthesia Evaluation     Patient summary reviewed and Nursing notes reviewed   NPO Solid Status: > 6 hours  NPO Liquid Status: > 2 hours           Airway   Mallampati: II  TM distance: >3 FB  Neck ROM: full  Dental - normal exam     Pulmonary    (-) COPD, asthma, not a smoker  Cardiovascular   Exercise tolerance: good (4-7 METS)    (-) past MI, dysrhythmias, angina      Neuro/Psych  (+) psychiatric history Anxiety  (-) seizures, CVA  GI/Hepatic/Renal/Endo    (+) obesity, thyroid problem   (-) GERD, liver disease, no renal disease, diabetes    Musculoskeletal     Abdominal    Substance History      OB/GYN          Other                    Anesthesia Plan    ASA 2     general       Anesthetic plan, risks, benefits, and alternatives have been provided, discussed and informed consent has been obtained with: patient.    CODE STATUS:

## 2023-11-07 NOTE — OP NOTE
Operative Note    Procedure:   1.  Hysteroscopy  2.  Dilation and curettage  3.  NovaSure endometrial ablation    Surgeon: Percy Mcintyre MD    Assistant: none    Preop diagnosis: Menorrhagia    Postop diagnosis: Same    Indications: Patient is a 43 year old female with bleeding refractory to conservative measures.      Findings: Proliferative endometrium but otherwise normal cavity.    Anesthesia: GETA    EBL: Minimal.    Pathology:  Endometrial curettings    Complications: None    Procedure: Patient was taken to operating room.  After adequate general anesthesia was administered, patient was placed on OR table in dorsal lithotomy position in candycane stirrups. The abdomen, vagina, perineum, and rectum was prepped and draped in a normal, sterile fashion. Patient identified with two identifiers and timeout performed with all team members agreeing to the planned procedure.    A weighted speculum was placed in the vagina, and anterior aspects of tract with a right angle retractor.  The anterior lip of the cervix was grasped with a single-tooth tenaculum.  The uterus was then sounded to 9 centimeters.  Cervical length was found to be 3 centimeters, giving a cavity length of 6 centimeters.  The cervix was gently dilated with Ihsan dilators. Hysteroscopy was performed.  Hysteroscopic findings were as noted above. The hysteroscope was then removed and a sharp curettage was performed.  This was sent as for permanent pathology.  The NovaSure device was then passed through the cervix into the uterus.  The device was deployed giving a cavity width of 4.6 centimeters.  The power was 155 horn.  The cavity assessment was performed and passed.  The device was then activated.  Ablation time was approximately 30 seconds.  The device was allowed time to cool, and then this was removed.  The hysteroscope was reinserted, and an adequate ablation was noted.  The hysteroscope was then removed.  The single-tooth tenaculum was removed  from the cervix.  Good hemostasis was noted.  All other instruments removed the vagina.    Counts for needles, sponges, laps and instruments were correct times two at the end of the procedure. I was present and scrubbed for the entire procedure. There were no major complications. The patient was transported to the recovery area in stable condition.     Disposition:  To PACU in stable condition.    Percy Mcintyre MD

## 2023-11-07 NOTE — ANESTHESIA POSTPROCEDURE EVALUATION
Patient: Deedee Lang    Procedure Summary       Date: 11/07/23 Room / Location: [facility] OR  / [facility] MAIN OR    Anesthesia Start: 0707 Anesthesia Stop: 0748    Procedure: DILATATION AND CURETTAGE HYSTEROSCOPY NOVASURE ENDOMETRIAL ABLATION (Vagina) Diagnosis:       Dysfunctional uterine bleeding      (Dysfunctional uterine bleeding [N93.8])    Surgeons: Percy Mcintyre MD Provider: Rajesh Galdamez MD    Anesthesia Type: general ASA Status: 2            Anesthesia Type: general    Vitals  Vitals Value Taken Time   /85 11/07/23 0801   Temp 36.7 °C (98 °F) 11/07/23 0747   Pulse 79 11/07/23 0807   Resp 16 11/07/23 0801   SpO2 98 % 11/07/23 0807   Vitals shown include unfiled device data.        Post Anesthesia Care and Evaluation    Patient location during evaluation: bedside  Patient participation: complete - patient participated  Level of consciousness: awake and alert  Pain management: adequate    Airway patency: patent  Anesthetic complications: No anesthetic complications  PONV Status: controlled  Cardiovascular status: blood pressure returned to baseline and acceptable  Respiratory status: acceptable  Hydration status: acceptable

## 2023-11-08 LAB
LAB AP CASE REPORT: NORMAL
PATH REPORT.FINAL DX SPEC: NORMAL
PATH REPORT.GROSS SPEC: NORMAL

## 2024-08-13 DIAGNOSIS — N63.10 MASS OF RIGHT BREAST, UNSPECIFIED QUADRANT: ICD-10-CM

## 2024-08-13 DIAGNOSIS — Z09 FOLLOW-UP EXAM, MORE THAN 1 YEAR SINCE PREVIOUS EXAM: Primary | ICD-10-CM

## 2024-10-22 ENCOUNTER — TELEPHONE (OUTPATIENT)
Dept: OBSTETRICS AND GYNECOLOGY | Facility: CLINIC | Age: 44
End: 2024-10-22
Payer: COMMERCIAL

## 2024-10-22 NOTE — TELEPHONE ENCOUNTER
LVM for pt to call [facility] directly to schedule for the 1 yr follow up DX Mammo & US.  Or call the office if she had any concerns.   [facility] has also tried to call pt 3 times to schedule.

## 2025-01-20 ENCOUNTER — TELEPHONE (OUTPATIENT)
Dept: OBSTETRICS AND GYNECOLOGY | Facility: CLINIC | Age: 45
End: 2025-01-20
Payer: COMMERCIAL

## 2025-01-20 NOTE — TELEPHONE ENCOUNTER
I tried calling the patient, there was no answer. I offered her to come in either today OR tomorrow. 1-20-25/lw

## 2025-01-20 NOTE — TELEPHONE ENCOUNTER
PT CB - ADV MSG WAS SENT TO CLINICAL - STATES SHE IS ON PAIN MEDS BUT PAIN LEVEL IS ABOUT 8 IF NOT ON MEDS, CHART REVIEW SHOWS U/S FROM 1-16

## 2025-01-20 NOTE — TELEPHONE ENCOUNTER
Law,    Patient is stating she went to [facility] on 1/16/24. I see no record of this in care everywhere. She is requesting an immediate appointment for endometrial ablation syndrome per her my chart request. How soon does she need to be seen?    Thanks Yana

## 2025-01-21 ENCOUNTER — OFFICE VISIT (OUTPATIENT)
Dept: OBSTETRICS AND GYNECOLOGY | Facility: CLINIC | Age: 45
End: 2025-01-21
Payer: COMMERCIAL

## 2025-01-21 VITALS
DIASTOLIC BLOOD PRESSURE: 78 MMHG | SYSTOLIC BLOOD PRESSURE: 117 MMHG | HEIGHT: 70 IN | WEIGHT: 233 LBS | BODY MASS INDEX: 33.36 KG/M2

## 2025-01-21 DIAGNOSIS — R10.2 PELVIC PAIN: Primary | ICD-10-CM

## 2025-01-21 RX ORDER — TRAMADOL HYDROCHLORIDE 25 MG/1
TABLET, COATED ORAL
COMMUNITY
Start: 2025-01-16

## 2025-01-21 RX ORDER — KETOCONAZOLE 20 MG/ML
SHAMPOO, SUSPENSION TOPICAL
COMMUNITY
Start: 2024-12-17

## 2025-01-21 RX ORDER — RIZATRIPTAN BENZOATE 10 MG/1
TABLET, ORALLY DISINTEGRATING ORAL
COMMUNITY
Start: 2025-01-13

## 2025-01-21 RX ORDER — TRETINOIN 0.5 MG/G
CREAM TOPICAL
COMMUNITY
Start: 2024-11-04

## 2025-01-21 RX ORDER — IBUPROFEN 800 MG/1
1 TABLET, FILM COATED ORAL 3 TIMES DAILY
COMMUNITY
Start: 2025-01-16

## 2025-01-22 NOTE — PROGRESS NOTES
Subjective   Deedee Lang is a 44 y.o. female.     Cc:  Pelvic pain    History of Present Illness - Patient is a 44 year old female who presents as follow up to pelvic pain.  Patient developed acute onset of lower abdominal-pelvic pain that rapidly progressed and patient presented to ED.  She underwent CT scan that was concerning for ovarian torsion versus hematometra.  She was evaluated by gynecology and ultimately underwent conservative management.  Patient had gradual improvement and was discharged home with close follow up.  She continues to have moderate pain that she manages with NSAIDS.  She was given prescription for Tramadol and has not used it yet.  She had some scant bleeding over the past several days.  Patient with history of endometrial ablation over a year ago and reports amenorrhea since procedure.  She reports that this is the first episode of pain since procedure.    The following portions of the patient's history were reviewed and updated as appropriate: She  has a past medical history of Abnormal Pap smear of cervix, ADHD, Anxiety, Bipolar disorder, COVID-19 (2021), Depression, Disease of thyroid gland, PMDD (premenstrual dysphoric disorder), and Sjogren's syndrome.  She  has a past surgical history that includes Breast Augmentation; Appendectomy;  section; d & c hysteroscopy endometrial ablation (N/A, 2023); and Tubal ligation.  Current Outpatient Medications on File Prior to Visit   Medication Sig    amphetamine-dextroamphetamine XR (ADDERALL XR) 30 MG 24 hr capsule Take 1 capsule by mouth 2 (Two) Times a Day    clonazePAM (KlonoPIN) 1 MG tablet TK 1 T PO BID PRN    ibuprofen (ADVIL,MOTRIN) 800 MG tablet Take 1 tablet by mouth 3 times a day.    ketoconazole (NIZORAL) 2 % shampoo APPLY TO SCALP, LEAVE ON 3-5 minutes THEN RINSE. USE 2-3 times WEEKLY AS NEEDED    lamoTRIgine (LaMICtal) 200 MG tablet TK 2 TS QAM AND 1 T PO QHS.    Magnesium 400 MG capsule Take 400 mg by mouth.     Retin-A 0.05 % cream APPLY A PEA SIZE AMOUNT TO FACE AT BEDTIME AS TOLERATED    rizatriptan MLT (MAXALT-MLT) 10 MG disintegrating tablet     traMADol HCl 25 MG tablet TAKE 1 TABLET BY MOUTH EVERY 6 HOURS AS NEEDED FOR PAIN. NOT TO EXCEED 400 MG DAILY    venlafaxine XR (EFFEXOR-XR) 150 MG 24 hr capsule Take 1 capsule by mouth Daily.    [DISCONTINUED] Emollient (Batsheva Retinol Correxion) cream Apply  topically to the appropriate area as directed.    [DISCONTINUED] ibuprofen (ADVIL,MOTRIN) 200 MG tablet Take 4 tablets by mouth Every 8 (Eight) Hours As Needed for Mild Pain .    [DISCONTINUED] levothyroxine (SYNTHROID, LEVOTHROID) 25 MCG tablet TAKE 1 TABLET BY MOUTH QD.    [DISCONTINUED] Ozempic, 1 MG/DOSE, 4 MG/3ML solution pen-injector INJECT 1MG INTO THE SKIN ONCE EVERY WEEK    [DISCONTINUED] pilocarpine (SALAGEN) 5 MG tablet Take 1 tablet by mouth 3 (Three) Times a Day.    [DISCONTINUED] Semaglutide,0.25 or 0.5MG/DOS, (OZEMPIC) 2 MG/3ML solution pen-injector Inject 0.5 mg under the skin into the appropriate area as directed Every 7 (Seven) Days.    [DISCONTINUED] topiramate (TOPAMAX) 100 MG tablet TAKE 1 TABLET BY MOUTH BID.    [DISCONTINUED] vitamin D (ERGOCALCIFEROL) 19913 units capsule capsule      No current facility-administered medications on file prior to visit.     She is allergic to codeine and gluten meal..    Review of Systems   Gastrointestinal:  Negative for nausea and vomiting.   Genitourinary:  Positive for pelvic pain. Negative for dysuria and frequency.       Objective   Physical Exam  Exam conducted with a chaperone present.   Constitutional:       Appearance: Normal appearance.   Abdominal:      Tenderness: There is no abdominal tenderness. There is no guarding or rebound.   Genitourinary:     General: Normal vulva.      Exam position: Lithotomy position.      Labia:         Right: No rash or tenderness.         Left: No tenderness.       Vagina: Normal.      Cervix: Normal.      Uterus: Normal.        Adnexa: Right adnexa normal and left adnexa normal.      Comments: Small dilator passed thru cervical os.  Neurological:      Mental Status: She is alert.   Psychiatric:         Mood and Affect: Mood normal.         Behavior: Behavior normal.         Thought Content: Thought content normal.         Judgment: Judgment normal.         Assessment & Plan   Diagnoses and all orders for this visit:    1. Pelvic pain (Primary)  -  Reviewed records.  Cervix was dilated but scant fluid released.  Recommend another brief period of observation.  If no improvement in 6 weeks, repeat sonogram and consider hysterectomy.  Patient to notify me if pain becomes rapidly progressive, patient to be seen sooner.    Percy Mcintyre MD

## 2025-02-07 DIAGNOSIS — R10.2 PELVIC PAIN: Primary | ICD-10-CM

## 2025-02-07 RX ORDER — TRAMADOL HYDROCHLORIDE 25 MG/1
25 TABLET, COATED ORAL EVERY 6 HOURS PRN
Qty: 12 TABLET | Refills: 0 | Status: SHIPPED | OUTPATIENT
Start: 2025-02-07

## 2025-02-18 ENCOUNTER — TELEPHONE (OUTPATIENT)
Dept: OBSTETRICS AND GYNECOLOGY | Facility: CLINIC | Age: 45
End: 2025-02-18
Payer: COMMERCIAL

## 2025-02-18 DIAGNOSIS — R10.2 PELVIC PAIN: ICD-10-CM

## 2025-02-18 RX ORDER — TRAMADOL HYDROCHLORIDE 25 MG/1
25 TABLET, COATED ORAL EVERY 6 HOURS PRN
Qty: 15 TABLET | Refills: 0 | Status: SHIPPED | OUTPATIENT
Start: 2025-02-18

## 2025-02-18 NOTE — TELEPHONE ENCOUNTER
Ghada    Let her know that this was called in.    If pain is ongoing, she should consider hysterectomy, as we discussed in office.    Francisco Javier

## 2025-02-18 NOTE — TELEPHONE ENCOUNTER
Pt states she was recently in hospital for D&C obstruction. Still in just as much pain, pt states she was prescribed Tramadol 2/7/25 that seems to help ease the pain.     traMADol HCl 25 MG tablet [[id number]]     Pt wanting to know if refill could be sent?    Pharmacy -   [id number] - [facility] [address] AT [facility] [phone] [facility] [phone] FX          Please advise, thank you!

## 2025-02-19 NOTE — TELEPHONE ENCOUNTER
Patient notified med called to pharmacy. Patient states she wants to proceed with hyst. Do you need to see her again? Please advise. Thanks, Izabel

## 2025-02-20 ENCOUNTER — HOSPITAL ENCOUNTER (EMERGENCY)
Facility: HOSPITAL | Age: 45
Discharge: HOME | End: 2025-02-21
Attending: EMERGENCY MEDICINE
Payer: COMMERCIAL

## 2025-02-20 ENCOUNTER — TELEPHONE (OUTPATIENT)
Dept: OBSTETRICS AND GYNECOLOGY | Facility: CLINIC | Age: 45
End: 2025-02-20
Payer: COMMERCIAL

## 2025-02-20 DIAGNOSIS — R10.30 LOWER ABDOMINAL PAIN: Primary | ICD-10-CM

## 2025-02-20 DIAGNOSIS — K57.32 DIVERTICULITIS OF SIGMOID COLON: ICD-10-CM

## 2025-02-20 LAB
BILIRUB UR QL STRIP: NEGATIVE
CLARITY UR: ABNORMAL
COLOR UR: YELLOW
GLUCOSE UR STRIP-MCNC: NEGATIVE MG/DL
HGB UR QL STRIP.AUTO: NEGATIVE
KETONES UR QL STRIP: NEGATIVE
LEUKOCYTE ESTERASE UR QL STRIP.AUTO: NEGATIVE
NITRITE UR QL STRIP: NEGATIVE
PH UR STRIP.AUTO: 5.5 [PH] (ref 5–8)
PROT UR QL STRIP: ABNORMAL
SP GR UR STRIP: >=1.03 (ref 1–1.03)
UROBILINOGEN UR QL STRIP: ABNORMAL

## 2025-02-20 PROCEDURE — 96374 THER/PROPH/DIAG INJ IV PUSH: CPT

## 2025-02-20 PROCEDURE — 99285 EMERGENCY DEPT VISIT HI MDM: CPT

## 2025-02-20 PROCEDURE — 81003 URINALYSIS AUTO W/O SCOPE: CPT

## 2025-02-20 PROCEDURE — 36415 COLL VENOUS BLD VENIPUNCTURE: CPT

## 2025-02-20 PROCEDURE — 96375 TX/PRO/DX INJ NEW DRUG ADDON: CPT

## 2025-02-20 NOTE — TELEPHONE ENCOUNTER
Hub staff attempted to follow warm transfer process and was unsuccessful     Caller: Deedee Lang    Relationship to patient: Self    Best call back number:[phone]      Patient is needing: CALLED BACK FOR AZUL FOR APPOINTMENT NEXT WEEK

## 2025-02-20 NOTE — TELEPHONE ENCOUNTER
I tried calling the patient there was no answer. She may come in Wednesday the 26th at 2 pm. 2-20-25/lw

## 2025-02-21 ENCOUNTER — APPOINTMENT (OUTPATIENT)
Dept: CT IMAGING | Facility: HOSPITAL | Age: 45
End: 2025-02-21
Payer: COMMERCIAL

## 2025-02-21 ENCOUNTER — APPOINTMENT (OUTPATIENT)
Dept: GENERAL RADIOLOGY | Facility: HOSPITAL | Age: 45
End: 2025-02-21
Payer: COMMERCIAL

## 2025-02-21 VITALS
SYSTOLIC BLOOD PRESSURE: 129 MMHG | HEIGHT: 70 IN | DIASTOLIC BLOOD PRESSURE: 94 MMHG | TEMPERATURE: 97.5 F | OXYGEN SATURATION: 97 % | WEIGHT: 220 LBS | HEART RATE: 74 BPM | BODY MASS INDEX: 31.5 KG/M2 | RESPIRATION RATE: 16 BRPM

## 2025-02-21 PROBLEM — R10.30 LOWER ABDOMINAL PAIN: Status: ACTIVE | Noted: 2025-02-21

## 2025-02-21 PROBLEM — K57.32 DIVERTICULITIS OF SIGMOID COLON: Status: ACTIVE | Noted: 2025-02-21

## 2025-02-21 LAB
ALBUMIN SERPL-MCNC: 4.4 G/DL (ref 3.5–5.2)
ALBUMIN/GLOB SERPL: 1.6 G/DL
ALP SERPL-CCNC: 91 U/L (ref 39–117)
ALT SERPL W P-5'-P-CCNC: 32 U/L (ref 1–33)
ANION GAP SERPL CALCULATED.3IONS-SCNC: 8.9 MMOL/L (ref 5–15)
AST SERPL-CCNC: 25 U/L (ref 1–32)
BASOPHILS # BLD AUTO: 0 10*3/MM3 (ref 0–0.2)
BASOPHILS NFR BLD AUTO: 0 % (ref 0–1.5)
BILIRUB SERPL-MCNC: 0.6 MG/DL (ref 0–1.2)
BUN SERPL-MCNC: 9 MG/DL (ref 6–20)
BUN/CREAT SERPL: 11.1 (ref 7–25)
CALCIUM SPEC-SCNC: 9.3 MG/DL (ref 8.6–10.5)
CHLORIDE SERPL-SCNC: 104 MMOL/L (ref 98–107)
CO2 SERPL-SCNC: 27.1 MMOL/L (ref 22–29)
CREAT SERPL-MCNC: 0.81 MG/DL (ref 0.57–1)
DEPRECATED RDW RBC AUTO: 40.7 FL (ref 37–54)
EGFRCR SERPLBLD CKD-EPI 2021: 91.9 ML/MIN/1.73
EOSINOPHIL # BLD AUTO: 0 10*3/MM3 (ref 0–0.4)
EOSINOPHIL NFR BLD AUTO: 0 % (ref 0.3–6.2)
ERYTHROCYTE [DISTWIDTH] IN BLOOD BY AUTOMATED COUNT: 12.3 % (ref 12.3–15.4)
GLOBULIN UR ELPH-MCNC: 2.8 GM/DL
GLUCOSE SERPL-MCNC: 90 MG/DL (ref 65–99)
HCT VFR BLD AUTO: 41.4 % (ref 34–46.6)
HGB BLD-MCNC: 13.8 G/DL (ref 12–15.9)
IMM GRANULOCYTES # BLD AUTO: 0.02 10*3/MM3 (ref 0–0.05)
IMM GRANULOCYTES NFR BLD AUTO: 0.2 % (ref 0–0.5)
LIPASE SERPL-CCNC: 18 U/L (ref 13–60)
LYMPHOCYTES # BLD AUTO: 2.85 10*3/MM3 (ref 0.7–3.1)
LYMPHOCYTES NFR BLD AUTO: 22.2 % (ref 19.6–45.3)
MCH RBC QN AUTO: 30 PG (ref 26.6–33)
MCHC RBC AUTO-ENTMCNC: 33.3 G/DL (ref 31.5–35.7)
MCV RBC AUTO: 90 FL (ref 79–97)
MONOCYTES # BLD AUTO: 0.85 10*3/MM3 (ref 0.1–0.9)
MONOCYTES NFR BLD AUTO: 6.6 % (ref 5–12)
NEUTROPHILS NFR BLD AUTO: 71 % (ref 42.7–76)
NEUTROPHILS NFR BLD AUTO: 9.14 10*3/MM3 (ref 1.7–7)
PLATELET # BLD AUTO: 301 10*3/MM3 (ref 140–450)
PMV BLD AUTO: 9.6 FL (ref 6–12)
POTASSIUM SERPL-SCNC: 4 MMOL/L (ref 3.5–5.2)
PROT SERPL-MCNC: 7.2 G/DL (ref 6–8.5)
RBC # BLD AUTO: 4.6 10*6/MM3 (ref 3.77–5.28)
SODIUM SERPL-SCNC: 140 MMOL/L (ref 136–145)
WBC NRBC COR # BLD AUTO: 12.86 10*3/MM3 (ref 3.4–10.8)

## 2025-02-21 PROCEDURE — 85025 COMPLETE CBC W/AUTO DIFF WBC: CPT | Performed by: EMERGENCY MEDICINE

## 2025-02-21 PROCEDURE — 25010000002 ONDANSETRON PER 1 MG: Performed by: EMERGENCY MEDICINE

## 2025-02-21 PROCEDURE — 96374 THER/PROPH/DIAG INJ IV PUSH: CPT

## 2025-02-21 PROCEDURE — 25510000001 IOPAMIDOL PER 1 ML: Performed by: EMERGENCY MEDICINE

## 2025-02-21 PROCEDURE — 99284 EMERGENCY DEPT VISIT MOD MDM: CPT | Performed by: EMERGENCY MEDICINE

## 2025-02-21 PROCEDURE — 74018 RADEX ABDOMEN 1 VIEW: CPT

## 2025-02-21 PROCEDURE — 25010000002 MORPHINE PER 10 MG: Performed by: EMERGENCY MEDICINE

## 2025-02-21 PROCEDURE — 83690 ASSAY OF LIPASE: CPT | Performed by: EMERGENCY MEDICINE

## 2025-02-21 PROCEDURE — 96375 TX/PRO/DX INJ NEW DRUG ADDON: CPT

## 2025-02-21 PROCEDURE — 80053 COMPREHEN METABOLIC PANEL: CPT | Performed by: EMERGENCY MEDICINE

## 2025-02-21 PROCEDURE — 74177 CT ABD & PELVIS W/CONTRAST: CPT

## 2025-02-21 RX ORDER — DICYCLOMINE HCL 20 MG
20 TABLET ORAL EVERY 8 HOURS PRN
Qty: 30 TABLET | Refills: 0 | Status: SHIPPED | OUTPATIENT
Start: 2025-02-21

## 2025-02-21 RX ORDER — DOCUSATE SODIUM 100 MG/1
100 CAPSULE, LIQUID FILLED ORAL 2 TIMES DAILY PRN
Qty: 30 CAPSULE | Refills: 0 | Status: SHIPPED | OUTPATIENT
Start: 2025-02-21

## 2025-02-21 RX ORDER — IOPAMIDOL 755 MG/ML
100 INJECTION, SOLUTION INTRAVASCULAR
Status: COMPLETED | OUTPATIENT
Start: 2025-02-21 | End: 2025-02-21

## 2025-02-21 RX ORDER — MORPHINE SULFATE 4 MG/ML
4 INJECTION, SOLUTION INTRAMUSCULAR; INTRAVENOUS ONCE
Status: COMPLETED | OUTPATIENT
Start: 2025-02-21 | End: 2025-02-21

## 2025-02-21 RX ORDER — SODIUM CHLORIDE 0.9 % (FLUSH) 0.9 %
10 SYRINGE (ML) INJECTION AS NEEDED
Status: DISCONTINUED | OUTPATIENT
Start: 2025-02-21 | End: 2025-02-21 | Stop reason: HOSPADM

## 2025-02-21 RX ORDER — ONDANSETRON 2 MG/ML
4 INJECTION INTRAMUSCULAR; INTRAVENOUS ONCE
Status: COMPLETED | OUTPATIENT
Start: 2025-02-21 | End: 2025-02-21

## 2025-02-21 RX ORDER — POLYETHYLENE GLYCOL 3350 17 G/17G
17 POWDER, FOR SOLUTION ORAL DAILY
Qty: 510 G | Refills: 0 | Status: SHIPPED | OUTPATIENT
Start: 2025-02-21

## 2025-02-21 RX ADMIN — IOPAMIDOL 100 ML: 755 INJECTION, SOLUTION INTRAVENOUS at 01:37

## 2025-02-21 RX ADMIN — Medication 10 ML: at 01:27

## 2025-02-21 RX ADMIN — Medication 10 ML: at 01:46

## 2025-02-21 RX ADMIN — ONDANSETRON 4 MG: 2 INJECTION INTRAMUSCULAR; INTRAVENOUS at 01:45

## 2025-02-21 RX ADMIN — MORPHINE SULFATE 4 MG: 4 INJECTION, SOLUTION INTRAMUSCULAR; INTRAVENOUS at 01:45

## 2025-02-21 RX ADMIN — AMOXICILLIN AND CLAVULANATE POTASSIUM 1 TABLET: 875; 125 TABLET, FILM COATED ORAL at 02:29

## 2025-02-21 NOTE — FSED PROVIDER NOTE
"Subjective   History of Present Illness  46 yo female presents w generalized lower abd pain for several days, \"cramping\" she is scheduled to have hysterectomy from her OB next week due to what they think is endometriosis. She reports nausea no vomiting. Occasional vaginal bleeding and pelvic pain similar to her prior chronic pelvic pain. Recently started taking tramadol which helps sometimes. She has normal bowel movements. No dysuria or hematuria. NO flank pain.         Review of Systems   All other systems reviewed and are negative.      Past Medical History:   Diagnosis Date    Abnormal Pap smear of cervix     ADHD     Anxiety     Bipolar disorder     COVID-19 2021    Depression     Disease of thyroid gland     PMDD (premenstrual dysphoric disorder)     Sjogren's syndrome        Allergies   Allergen Reactions    Codeine Nausea Only    Gluten Meal Other (See Comments)     BLOATING AND DIARRHEA       Past Surgical History:   Procedure Laterality Date    APPENDECTOMY      BREAST AUGMENTATION       SECTION      D & C HYSTEROSCOPY ENDOMETRIAL ABLATION N/A 2023    Procedure: DILATATION AND CURETTAGE HYSTEROSCOPY NOVASURE ENDOMETRIAL ABLATION;  Surgeon: Percy Mcintyre MD;  Location: [facility] MAIN OR;  Service: Obstetrics/Gynecology;  Laterality: N/A;    TUBAL ABDOMINAL LIGATION         Family History   Problem Relation Age of Onset    Depression Mother     Dystonia Mother     Diabetes Father     Depression Father     Muscular dystrophy Maternal Grandmother     Alzheimer's disease Maternal Grandmother     Muscular dystrophy Maternal Grandfather     Colon cancer Paternal Grandmother     Bone cancer Paternal Grandfather     Malig Hyperthermia Neg Hx        Social History     Socioeconomic History    Marital status: Single   Tobacco Use    Smoking status: Never   Vaping Use    Vaping status: Never Used   Substance and Sexual Activity    Alcohol use: Not Currently    Drug use: Never    Sexual " activity: Yes     Birth control/protection: None, Tubal ligation           Objective   Physical Exam  Vitals and nursing note reviewed.   Constitutional:       General: She is not in acute distress.     Appearance: Normal appearance. She is not toxic-appearing.   HENT:      Head: Normocephalic.      Nose: Nose normal.      Mouth/Throat:      Mouth: Mucous membranes are moist.   Eyes:      Pupils: Pupils are equal, round, and reactive to light.   Cardiovascular:      Rate and Rhythm: Normal rate and regular rhythm.      Pulses: Normal pulses.      Heart sounds: Normal heart sounds.   Pulmonary:      Effort: Pulmonary effort is normal. No respiratory distress.      Breath sounds: Normal breath sounds. No wheezing or rales.   Abdominal:      General: There is no distension.      Palpations: Abdomen is soft.      Tenderness: There is no abdominal tenderness.   Musculoskeletal:         General: No swelling, tenderness or deformity. Normal range of motion.      Cervical back: Normal range of motion.   Skin:     General: Skin is warm and dry.   Neurological:      General: No focal deficit present.      Mental Status: She is alert and oriented to person, place, and time. Mental status is at baseline.   Psychiatric:         Mood and Affect: Mood normal.         Procedures           ED Course                                           Medical Decision Making  44 yo female w lower abd pain, crampy. VSS. Mild tenderness on exam lower abdomen. No more vaginal bleeding than her usual. Scheduled for hysterectomy this week.   Labs wnl   CT abd and pelvis shows mild inflammation sigmoid, around diverticula, no abscess or other complication. Will go ahead and treat w augmentin.   Also d/w pt large volume of stool, could be due to tramadol and she doesn't drink a lot of water She has not taken any stool softeners at home so sent miralax and colace.     Problems Addressed:  Diverticulitis of sigmoid colon: complicated acute illness or  injury  Lower abdominal pain: complicated acute illness or injury    Amount and/or Complexity of Data Reviewed  Labs: ordered.     Details: Labs Reviewed  URINALYSIS WITHOUT MICROSCOPIC (NO CULTURE) - Abnormal; Notable for the following components:     Appearance, UA                  (*)                  Protein, UA                   Trace (*)            All other components within normal limits  CBC WITH AUTO DIFFERENTIAL - Abnormal; Notable for the following components:     WBC                           12.86 (*)               Eosinophil %                  0.0 (*)                Neutrophils, Absolute         9.14 (*)            All other components within normal limits  LIPASE - Normal  COMPREHENSIVE METABOLIC PANEL         Narrative: GFR Categories in Chronic Kidney Disease (CKD)                                      GFR Category          GFR (mL/min/1.73)    Interpretation                  G1                     90 or greater         Normal or high (1)                  G2                      60-89                Mild decrease (1)                  G3a                   45-59                Mild to moderate decrease                  G3b                   30-44                Moderate to severe decrease                  G4                    15-29                Severe decrease                  G5                    14 or less           Kidney failure                                          (1)In the absence of evidence of kidney disease, neither GFR category G1 or G2 fulfill the criteria for CKD.                                    eGFR calculation 2021 CKD-EPI creatinine equation, which does not include race as a factor  CBC AND DIFFERENTIAL    Radiology: ordered.     Details: CT Abdomen Pelvis With Contrast   Final Result         1. The patient's endometrial canal appears distended, with surrounding    hyperemia, which may be related to prior ablation. This was described in    the prior imaging report.    2.  There is some mild stranding which is noted adjacent to the proximal    sigmoid colon. Mild focal colitis or diverticulitis is not excluded.    3. Increased stool burden within the colon, particularly the ascending    and transverse colon, which may reflect constipation.         Radiation dose reduction techniques were utilized, including automated    exposure control and exposure modulation based on body size.              This report was finalized on 2/21/2025 2:10 AM by Dr. Antonella Palacios M.D on Workstation: [id number]          XR Abdomen KUB   Final Result    Increased stool burden seen throughout the colon, in keeping with    history of constipation. This is most significant within the ascending    colon.         This report was finalized on 2/21/2025 12:22 AM by Dr. Antonella Palacios M.D on Workstation: [id number]              Risk  OTC drugs.  Prescription drug management.        Final diagnoses:   Lower abdominal pain   Diverticulitis of sigmoid colon       ED Disposition  ED Disposition       ED Disposition   Discharge    Condition   Stable    Comment   --               Aniya Garcia, APRN  [address] [facility] [address] [phone]      As needed         Medication List        New Prescriptions      amoxicillin-clavulanate 875-125 MG per tablet  Commonly known as: AUGMENTIN  Take 1 tablet by mouth 2 (Two) Times a Day for 10 days.     dicyclomine 20 MG tablet  Commonly known as: BENTYL  Take 1 tablet by mouth Every 8 (Eight) Hours As Needed for Abdominal Cramping for up to 30 doses.     docusate sodium 100 MG capsule  Commonly known as: COLACE  Take 1 capsule by mouth 2 (Two) Times a Day As Needed for Constipation for up to 30 doses.     polyethylene glycol 17 GM/SCOOP powder  Commonly known as: MIRALAX  Take 17 g by mouth Daily.               Where to Get Your Medications        These medications were sent to [id number] - [place], [place] - [address] AT [facility]  [address] [facility] [phone]  - [phone] FX  [address], [facility] [address]      Phone: [phone]   amoxicillin-clavulanate 875-125 MG per tablet  dicyclomine 20 MG tablet  docusate sodium 100 MG capsule  polyethylene glycol 17 GM/SCOOP powder

## 2025-02-24 ENCOUNTER — APPOINTMENT (OUTPATIENT)
Dept: WOMENS IMAGING | Facility: HOSPITAL | Age: 45
End: 2025-02-24
Payer: COMMERCIAL

## 2025-02-24 PROCEDURE — 76642 ULTRASOUND BREAST LIMITED: CPT | Performed by: RADIOLOGY

## 2025-02-24 PROCEDURE — 77066 DX MAMMO INCL CAD BI: CPT | Performed by: RADIOLOGY

## 2025-02-24 PROCEDURE — 77062 BREAST TOMOSYNTHESIS BI: CPT | Performed by: RADIOLOGY

## 2025-02-24 PROCEDURE — G0279 TOMOSYNTHESIS, MAMMO: HCPCS | Performed by: RADIOLOGY

## 2025-02-26 ENCOUNTER — OFFICE VISIT (OUTPATIENT)
Dept: OBSTETRICS AND GYNECOLOGY | Facility: CLINIC | Age: 45
End: 2025-02-26
Payer: COMMERCIAL

## 2025-02-26 VITALS
WEIGHT: 230 LBS | DIASTOLIC BLOOD PRESSURE: 80 MMHG | SYSTOLIC BLOOD PRESSURE: 122 MMHG | BODY MASS INDEX: 32.93 KG/M2 | HEIGHT: 70 IN

## 2025-02-26 DIAGNOSIS — R10.2 PELVIC PAIN: Primary | ICD-10-CM

## 2025-02-26 RX ORDER — LANOLIN ALCOHOL/MO/W.PET/CERES
CREAM (GRAM) TOPICAL
COMMUNITY
Start: 2025-02-07

## 2025-02-26 RX ORDER — SENNOSIDES 8.6 MG
1 CAPSULE ORAL 3 TIMES DAILY
COMMUNITY
Start: 2025-02-17

## 2025-02-26 RX ORDER — DOCUSATE SODIUM 50 MG AND SENNOSIDES 8.6 MG 8.6; 5 MG/1; MG/1
TABLET, FILM COATED ORAL
COMMUNITY
Start: 2025-02-17

## 2025-02-27 DIAGNOSIS — N63.10 MASS OF RIGHT BREAST, UNSPECIFIED QUADRANT: ICD-10-CM

## 2025-02-27 DIAGNOSIS — Z09 FOLLOW-UP EXAM, MORE THAN 1 YEAR SINCE PREVIOUS EXAM: ICD-10-CM

## 2025-02-28 NOTE — PROGRESS NOTES
Subjective   Deedee Lang is a 45 y.o. female.     Cc:  Pelvic pain    History of Present Illness - Patient is a 45 year old female with pelvic pain likely related to hematocolpos from previous ablation.  Attempts to open cervical canal were unsuccessful.  Patient has tried conservative measures but requires medication daily to manage pain.  Recently, she had an exacerbation and work up in ER was significant for diverticular disease.    The following portions of the patient's history were reviewed and updated as appropriate: She  has a past medical history of Abnormal Pap smear of cervix, ADHD, Anxiety, Bipolar disorder, COVID-19 (11/2021), Depression, Disease of thyroid gland, Diverticulitis, PMDD (premenstrual dysphoric disorder), and Sjogren's syndrome.  She  reports that she has never smoked. She does not have any smokeless tobacco history on file. She reports that she does not currently use alcohol. She reports that she does not use drugs.  Current Outpatient Medications   Medication Sig Dispense Refill    acetaminophen (TYLENOL) 650 MG 8 hr tablet Take 1 tablet by mouth 3 times a day.      Magnesium Oxide -Mg Supplement 400 (240 Mg) MG tablet TAKE 1 TABLET BY MOUTH TWICE DAILY FOR MIGRAINE PREVENTION      Stimulant Laxative 8.6-50 MG per tablet TAKE 2 TABLETS BY MOUTH TWICE DAILY AS NEEDED FOR CONSTIPATION      traMADol HCl 25 MG tablet Take 25 mg by mouth Every 6 (Six) Hours As Needed (pain). 15 tablet 0    amoxicillin-clavulanate (AUGMENTIN) 875-125 MG per tablet Take 1 tablet by mouth 2 (Two) Times a Day for 10 days. 20 tablet 0    amphetamine-dextroamphetamine XR (ADDERALL XR) 30 MG 24 hr capsule Take 1 capsule by mouth 2 (Two) Times a Day      clonazePAM (KlonoPIN) 1 MG tablet TK 1 T PO BID PRN  0    dicyclomine (BENTYL) 20 MG tablet Take 1 tablet by mouth Every 8 (Eight) Hours As Needed for Abdominal Cramping for up to 30 doses. 30 tablet 0    docusate sodium (COLACE) 100 MG capsule Take 1 capsule  by mouth 2 (Two) Times a Day As Needed for Constipation for up to 30 doses. 30 capsule 0    lamoTRIgine (LaMICtal) 200 MG tablet TK 2 TS QAM AND 1 T PO QHS.  1    polyethylene glycol (MIRALAX) 17 GM/SCOOP powder Take 17 g by mouth Daily. 510 g 0    Retin-A 0.05 % cream APPLY A PEA SIZE AMOUNT TO FACE AT BEDTIME AS TOLERATED      rizatriptan MLT (MAXALT-MLT) 10 MG disintegrating tablet       venlafaxine XR (EFFEXOR-XR) 150 MG 24 hr capsule Take 1 capsule by mouth Daily.       No current facility-administered medications for this visit.     She is allergic to codeine and gluten meal..    Review of Systems   Constitutional:  Negative for chills and fever.   Genitourinary:  Positive for pelvic pain.       Objective   Physical Exam  Vitals reviewed. Exam conducted with a chaperone present.   Constitutional:       Appearance: Normal appearance.   Abdominal:      Tenderness: There is no abdominal tenderness. There is no guarding or rebound.   Genitourinary:     General: Normal vulva.      Exam position: Lithotomy position.      Labia:         Right: No rash or tenderness.         Left: No rash or tenderness.       Vagina: Normal.      Cervix: Normal.      Uterus: Normal.       Adnexa:         Right: No mass.          Left: No mass.     Neurological:      Mental Status: She is alert.   Psychiatric:         Mood and Affect: Mood normal.         Behavior: Behavior normal.         Thought Content: Thought content normal.         Judgment: Judgment normal.         Assessment & Plan   Diagnoses and all orders for this visit:    1. Pelvic pain (Primary)  - Complex history.  Patient with recent diagnosis of diverticulitis.  On the other hand, she has likely hematocolpos from previous ablation.  Discussed that management and treatment of pelvic pain is complex.  Discussed hysterectomy is not a guarantee to control pain.  Discussed pain arising for colonic issues.  Discussed completing antibiotics and follow up with GI.  If no  improvement in next 4 to 6 weeks, consider hysterectomy.  Discussed laparoscopic hysterectomy at length.  Discussed nature of procedure as well as risks including bleeding, infection, injury to internal organs, anesthesia.  Patient will follow up with me in one month.    Percy Mcintyre MD

## 2025-03-17 ENCOUNTER — TELEPHONE (OUTPATIENT)
Dept: OBSTETRICS AND GYNECOLOGY | Facility: CLINIC | Age: 45
End: 2025-03-17

## 2025-03-17 NOTE — TELEPHONE ENCOUNTER
Caller: Deedee Lang    Relationship to patient: Self    Best call back number: [phone] (home)       Patient is needing: PT SEEN DR SHELLEY IN FEB AND THEY DISCUSSED A HYSTERECTOMY. SHE WASN'T SURE AT THE TIME BUT WOULD LIKE TO PROCEED WITH THIS. SHE IS SCHEDULED FOR HER ANNUAL WITH DR SHELLEY ON 04/01. SHE STATES SHE WOULD LIKE TO GET THE PROCESS STARTED FOR THE HYSTERECTOMY ASAP.

## 2025-03-19 DIAGNOSIS — R10.2 PELVIC PAIN: ICD-10-CM

## 2025-03-19 RX ORDER — TRAMADOL HYDROCHLORIDE 25 MG/1
25 TABLET, COATED ORAL EVERY 6 HOURS PRN
Qty: 25 TABLET | Refills: 0 | Status: SHIPPED | OUTPATIENT
Start: 2025-03-19

## 2025-03-21 DIAGNOSIS — R10.2 PELVIC PAIN: Primary | ICD-10-CM

## 2025-03-26 ENCOUNTER — TELEPHONE (OUTPATIENT)
Dept: OBSTETRICS AND GYNECOLOGY | Facility: CLINIC | Age: 45
End: 2025-03-26
Payer: COMMERCIAL

## 2025-03-26 DIAGNOSIS — R10.2 PELVIC PAIN: ICD-10-CM

## 2025-03-26 RX ORDER — TRAMADOL HYDROCHLORIDE 25 MG/1
25 TABLET, COATED ORAL EVERY 6 HOURS PRN
Qty: 25 TABLET | Refills: 0 | Status: SHIPPED | OUTPATIENT
Start: 2025-03-26

## 2025-03-26 NOTE — TELEPHONE ENCOUNTER
Casandra    Patient reporting severe pain.  I was able to get her on for surgery next Wednesday at 7:30.  Find out if she can do that time and date.    I refilled her Tramadol.  She should take Tylenol at same time to see if she get relief.    Let me know.    Francisco Javier

## 2025-03-26 NOTE — TELEPHONE ENCOUNTER
Pt in tears on call due to pain, states she feels tramadol is not working and would like to know if anything else for pain can be sent?    Please advise, thank you!    Pharmacy -   [id number] - [facility] [address] AT [facility] [phone] [facility] [phone] FX

## 2025-03-27 ENCOUNTER — TELEPHONE (OUTPATIENT)
Dept: OBSTETRICS AND GYNECOLOGY | Facility: CLINIC | Age: 45
End: 2025-03-27
Payer: COMMERCIAL

## 2025-03-27 ENCOUNTER — TRANSCRIBE ORDERS (OUTPATIENT)
Dept: OBSTETRICS AND GYNECOLOGY | Facility: CLINIC | Age: 45
End: 2025-03-27
Payer: COMMERCIAL

## 2025-03-27 DIAGNOSIS — R10.2 PELVIC PAIN IN FEMALE: Primary | ICD-10-CM

## 2025-03-28 ENCOUNTER — TELEPHONE (OUTPATIENT)
Dept: OBSTETRICS AND GYNECOLOGY | Facility: CLINIC | Age: 45
End: 2025-03-28
Payer: COMMERCIAL

## 2025-03-28 NOTE — TELEPHONE ENCOUNTER
Pt is aware of Passport showing termed on March 31st pt just needs to provide document to [employer] .  Pt states that she is trying to get the info to them.  Pt also has been advised she needs to sign hysterectomy form asap.  Pt has been advised if ins is showing inactive on April 1 then we might have to reschedule surgery and pt understands.

## 2025-03-31 ENCOUNTER — PRE-ADMISSION TESTING (OUTPATIENT)
Dept: PREADMISSION TESTING | Facility: HOSPITAL | Age: 45
End: 2025-03-31
Payer: COMMERCIAL

## 2025-03-31 VITALS
HEART RATE: 93 BPM | WEIGHT: 229.3 LBS | DIASTOLIC BLOOD PRESSURE: 76 MMHG | RESPIRATION RATE: 16 BRPM | OXYGEN SATURATION: 98 % | SYSTOLIC BLOOD PRESSURE: 111 MMHG | HEIGHT: 69 IN | BODY MASS INDEX: 33.96 KG/M2 | TEMPERATURE: 98 F

## 2025-03-31 DIAGNOSIS — R10.2 PELVIC PAIN IN FEMALE: ICD-10-CM

## 2025-03-31 LAB
ABO GROUP BLD: NORMAL
BLD GP AB SCN SERPL QL: NEGATIVE
HCG SERPL QL: NEGATIVE
RH BLD: POSITIVE
T&S EXPIRATION DATE: NORMAL

## 2025-03-31 PROCEDURE — 86901 BLOOD TYPING SEROLOGIC RH(D): CPT

## 2025-03-31 PROCEDURE — 36415 COLL VENOUS BLD VENIPUNCTURE: CPT

## 2025-03-31 PROCEDURE — 85027 COMPLETE CBC AUTOMATED: CPT | Performed by: OBSTETRICS & GYNECOLOGY

## 2025-03-31 PROCEDURE — 86850 RBC ANTIBODY SCREEN: CPT

## 2025-03-31 PROCEDURE — 80048 BASIC METABOLIC PNL TOTAL CA: CPT | Performed by: OBSTETRICS & GYNECOLOGY

## 2025-03-31 PROCEDURE — 84703 CHORIONIC GONADOTROPIN ASSAY: CPT

## 2025-03-31 PROCEDURE — 86900 BLOOD TYPING SEROLOGIC ABO: CPT

## 2025-03-31 NOTE — DISCHARGE INSTRUCTIONS
Take the following medications the morning of surgery:    LAMICTAL  EFFEXOR    If you are on prescription narcotic pain medication to control your pain you may also take that medication the morning of surgery.      General Instructions:     Do not eat solid food after midnight the night before surgery.  Clear liquids day of surgery are allowed but must be stopped at least two hours before your hospital arrival time.       Allowed clear liquids      Water, sodas, and tea or coffee with no cream or milk added.       12 to 20 ounces of a clear liquid that contains carbohydrates is recommended.  If non-diabetic, have Gatorade or Powerade.  If diabetic, have G2 or Powerade Zero.     Do not have liquids red in color.  Do not consume chicken, beef, pork or vegetable broth or bouillon cubes of any variety as they are not considered clear liquids and are not allowed.      Infants may have breast milk up to four hours before surgery.  Infants drinking formula may drink formula up to six hours before surgery.   Patients who avoid smoking, chewing tobacco and alcohol for 4 weeks prior to surgery have a reduced risk of post-operative complications.  Quit smoking as many days before surgery as you can.  Do not smoke, use chewing tobacco or drink alcohol the day of surgery.   If applicable bring your C-PAP/ BI-PAP machine in with you to preop day of surgery.  Bring any papers given to you in the doctor’s office.  Wear clean comfortable clothes.  Do not wear contact lenses, false eyelashes or make-up.  Bring a case for your glasses.   Bring crutches or walker if applicable.  Remove all piercings.  Leave jewelry and any other valuables at home.  Hair extensions with metal clips must be removed prior to surgery.  The Pre-Admission Testing nurse will instruct you to bring medications if unable to obtain an accurate list in Pre-Admission Testing.    Day of surgery you will need to let the preoperative nurse know the last time you took  each of your medications.  To ensure a safe environment for patients and staff, we kindly ask that children under the age of 16 not accompany patients.  If you must bring a dependent child or dependent adult please ensure a responsible adult, other than yourself, is present to supervise them.      If you were given a blood bank ID arm band remember to bring it with you the day of surgery.    Preventing a Surgical Site Infection:  For 2 to 3 days before surgery, avoid shaving with a razor because the razor can irritate skin and make it easier to develop an infection.    Any areas of open skin can increase the risk of a post-operative wound infection by allowing bacteria to enter and travel throughout the body.  Notify your surgeon if you have any skin wounds / rashes even if it is not near the expected surgical site.  The area will need assessed to determine if surgery should be delayed until it is healed.  The night prior to surgery shower using a fresh bar of anti-bacterial soap (such as Dial) and clean washcloth.  Sleep in a clean bed with clean clothing.  Do not allow pets to sleep with you.  Shower on the morning of surgery using a fresh bar of anti-bacterial soap (such as Dial) and clean washcloth.  Dry with a clean towel and dress in clean clothing.  Ask your surgeon if you will be receiving antibiotics prior to surgery.  Make sure you, your family, and all healthcare providers clean their hands with soap and water or an alcohol based hand  before caring for you or your wound.    Day of surgery:  Your arrival time is approximately two hours before your scheduled surgery time.  Please note if you have an early arrival time the surgery doors do not open before 5:00 AM.  Upon arrival, a Pre-op nurse and Anesthesiologist will review your health history, obtain vital signs, and answer questions you may have.  The only belongings needed at this time will be a list of your home medications and if applicable  your C-PAP/BI-PAP machine.  A Pre-op nurse will start an IV and you may receive medication in preparation for surgery, including something to help you relax.     Please be aware that surgery does come with discomfort.  We want to make every effort to control your discomfort so please discuss any uncontrolled symptoms with your nurse.   Your doctor will most likely have prescribed pain medications.      If you are going home after surgery you will receive individualized written care instructions before being discharged.  A responsible adult must drive you to and from the hospital on the day of your surgery and ideally stay with you through the night.   .  Discharge prescriptions can be filled by the hospital pharmacy during regular pharmacy hours.  If you are having surgery late in the day/evening your prescription may be e-prescribed to your pharmacy.  Please verify your pharmacy hours or chose a 24 hour pharmacy to avoid not having access to your prescription because your pharmacy has closed for the day.    If you are staying overnight following surgery, you will be transported to your hospital room following the recovery period.  [facility] has all private rooms.    If you have any questions please call Pre-Admission Testing at [phone].  Deductibles and co-payments are collected on the day of service. Please be prepared to pay the required co-pay, deductible or deposit on the day of service as defined by your plan.    Call your surgeon immediately if you experience any of the following symptoms:  Sore Throat  Shortness of Breath or difficulty breathing  Cough  Chills  Body soreness or muscle pain  Headache  Fever  New loss of taste or smell  Do not arrive for your surgery ill.  Your procedure will need to be rescheduled to another time.  You will need to call your physician before the day of surgery to avoid any unnecessary exposure to hospital staff as well as other patients.

## 2025-04-01 ENCOUNTER — TELEPHONE (OUTPATIENT)
Dept: OBSTETRICS AND GYNECOLOGY | Facility: CLINIC | Age: 45
End: 2025-04-01
Payer: COMMERCIAL

## 2025-04-01 DIAGNOSIS — R10.2 PELVIC PAIN: Primary | ICD-10-CM

## 2025-04-01 RX ORDER — HYDROCODONE BITARTRATE AND ACETAMINOPHEN 5; 325 MG/1; MG/1
1 TABLET ORAL EVERY 6 HOURS PRN
Qty: 4 TABLET | Refills: 0 | Status: SHIPPED | OUTPATIENT
Start: 2025-04-01 | End: 2025-04-12 | Stop reason: HOSPADM

## 2025-04-01 NOTE — TELEPHONE ENCOUNTER
Casandra    I gave her Norco.  She will need to stretch these out until surgery can be rescheduled.  They can cause nausea as well.  She is probably having an interaction of the medication pain medication that is prescribed.    Francisco Javier

## 2025-04-01 NOTE — TELEPHONE ENCOUNTER
Left v/m to advise pt that her insurance is still showing inactive.  Sending message to Dr Mcintyre since pt has appt today and surgery scheduled for tomorrow.

## 2025-04-01 NOTE — TELEPHONE ENCOUNTER
Pts ins is just now showing active but it will take 2-3 days to get it authorized because it not an emergency.  Pt is also stating that the pain is so bad that it is making her throw up and she can't hardly stand the pain.  Pt is requesting something stronger if possible.

## 2025-04-02 NOTE — TELEPHONE ENCOUNTER
Casandra    This is now scheduled next Monday April 7th at 7:30.  Find out if [employer] can assist.    Francisco Javier

## 2025-04-04 ENCOUNTER — TELEPHONE (OUTPATIENT)
Dept: OBSTETRICS AND GYNECOLOGY | Facility: CLINIC | Age: 45
End: 2025-04-04
Payer: COMMERCIAL

## 2025-04-07 ENCOUNTER — ANESTHESIA (OUTPATIENT)
Dept: PERIOP | Facility: HOSPITAL | Age: 45
End: 2025-04-07
Payer: COMMERCIAL

## 2025-04-07 ENCOUNTER — HOSPITAL ENCOUNTER (INPATIENT)
Facility: HOSPITAL | Age: 45
LOS: 5 days | Discharge: HOME | End: 2025-04-12
Attending: OBSTETRICS & GYNECOLOGY | Admitting: OBSTETRICS & GYNECOLOGY
Payer: COMMERCIAL

## 2025-04-07 ENCOUNTER — ANESTHESIA EVENT (OUTPATIENT)
Dept: PERIOP | Facility: HOSPITAL | Age: 45
End: 2025-04-07
Payer: COMMERCIAL

## 2025-04-07 DIAGNOSIS — R10.2 PELVIC PAIN: ICD-10-CM

## 2025-04-07 DIAGNOSIS — K57.20 DIVERTICULITIS OF LARGE INTESTINE WITH PERFORATION WITHOUT ABSCESS OR BLEEDING: Primary | ICD-10-CM

## 2025-04-07 DIAGNOSIS — Z90.710 S/P LAPAROSCOPIC HYSTERECTOMY: ICD-10-CM

## 2025-04-07 PROBLEM — K57.32 DIVERTICULITIS LARGE INTESTINE: Status: ACTIVE | Noted: 2025-04-07

## 2025-04-07 PROCEDURE — 44204 LAPARO PARTIAL COLECTOMY: CPT | Performed by: SURGERY

## 2025-04-07 PROCEDURE — 58571 TLH W/T/O 250 G OR LESS: CPT | Performed by: OBSTETRICS & GYNECOLOGY

## 2025-04-07 PROCEDURE — 81025 URINE PREGNANCY TEST: CPT | Performed by: ANESTHESIOLOGY

## 2025-04-07 PROCEDURE — 0DBN4ZZ EXCISION OF SIGMOID COLON, PERCUTANEOUS ENDOSCOPIC APPROACH: ICD-10-PCS | Performed by: SURGERY

## 2025-04-07 PROCEDURE — 25010000002 ALBUMIN HUMAN 5% PER 50 ML: Performed by: NURSE ANESTHETIST, CERTIFIED REGISTERED

## 2025-04-07 PROCEDURE — 88342 IMHCHEM/IMCYTCHM 1ST ANTB: CPT | Performed by: OBSTETRICS & GYNECOLOGY

## 2025-04-07 PROCEDURE — 99221 1ST HOSP IP/OBS SF/LOW 40: CPT | Performed by: SURGERY

## 2025-04-07 PROCEDURE — 0UT94ZZ RESECTION OF UTERUS, PERCUTANEOUS ENDOSCOPIC APPROACH: ICD-10-PCS | Performed by: OBSTETRICS & GYNECOLOGY

## 2025-04-07 PROCEDURE — 25010000002 KETOROLAC TROMETHAMINE PER 15 MG: Performed by: SURGERY

## 2025-04-07 PROCEDURE — 25010000002 PROPOFOL 10 MG/ML EMULSION: Performed by: NURSE ANESTHETIST, CERTIFIED REGISTERED

## 2025-04-07 PROCEDURE — S0260 H&P FOR SURGERY: HCPCS | Performed by: OBSTETRICS & GYNECOLOGY

## 2025-04-07 PROCEDURE — 25010000002 FAMOTIDINE 10 MG/ML SOLUTION: Performed by: ANESTHESIOLOGY

## 2025-04-07 PROCEDURE — 88305 TISSUE EXAM BY PATHOLOGIST: CPT | Performed by: SURGERY

## 2025-04-07 PROCEDURE — 25010000002 FENTANYL CITRATE (PF) 50 MCG/ML SOLUTION: Performed by: NURSE ANESTHETIST, CERTIFIED REGISTERED

## 2025-04-07 PROCEDURE — 25010000002 CEFOXITIN PER 1 G: Performed by: NURSE ANESTHETIST, CERTIFIED REGISTERED

## 2025-04-07 PROCEDURE — 25010000002 HYDROMORPHONE 1 MG/ML SOLUTION: Performed by: NURSE ANESTHETIST, CERTIFIED REGISTERED

## 2025-04-07 PROCEDURE — 88307 TISSUE EXAM BY PATHOLOGIST: CPT | Performed by: OBSTETRICS & GYNECOLOGY

## 2025-04-07 PROCEDURE — P9041 ALBUMIN (HUMAN),5%, 50ML: HCPCS | Performed by: NURSE ANESTHETIST, CERTIFIED REGISTERED

## 2025-04-07 PROCEDURE — 25010000002 ONDANSETRON PER 1 MG: Performed by: SURGERY

## 2025-04-07 PROCEDURE — 25010000002 HYDROMORPHONE PER 4 MG: Performed by: SURGERY

## 2025-04-07 PROCEDURE — 25010000002 SUGAMMADEX 200 MG/2ML SOLUTION: Performed by: NURSE ANESTHETIST, CERTIFIED REGISTERED

## 2025-04-07 PROCEDURE — 25010000002 ONDANSETRON PER 1 MG: Performed by: NURSE ANESTHETIST, CERTIFIED REGISTERED

## 2025-04-07 PROCEDURE — 25010000002 HYDROMORPHONE PER 4 MG: Performed by: NURSE ANESTHETIST, CERTIFIED REGISTERED

## 2025-04-07 PROCEDURE — 25010000002 CEFAZOLIN PER 500 MG: Performed by: OBSTETRICS & GYNECOLOGY

## 2025-04-07 PROCEDURE — 25010000002 MIDAZOLAM PER 1 MG: Performed by: ANESTHESIOLOGY

## 2025-04-07 PROCEDURE — 25810000003 LACTATED RINGERS PER 1000 ML: Performed by: ANESTHESIOLOGY

## 2025-04-07 PROCEDURE — 25010000002 DEXAMETHASONE SODIUM PHOSPHATE 20 MG/5ML SOLUTION: Performed by: NURSE ANESTHETIST, CERTIFIED REGISTERED

## 2025-04-07 PROCEDURE — 25010000002 PHENYLEPHRINE 10 MG/ML SOLUTION: Performed by: NURSE ANESTHETIST, CERTIFIED REGISTERED

## 2025-04-07 PROCEDURE — 0UT74ZZ RESECTION OF BILATERAL FALLOPIAN TUBES, PERCUTANEOUS ENDOSCOPIC APPROACH: ICD-10-PCS | Performed by: OBSTETRICS & GYNECOLOGY

## 2025-04-07 PROCEDURE — 88307 TISSUE EXAM BY PATHOLOGIST: CPT | Performed by: SURGERY

## 2025-04-07 PROCEDURE — 25010000002 DROPERIDOL PER 5 MG: Performed by: NURSE ANESTHETIST, CERTIFIED REGISTERED

## 2025-04-07 DEVICE — ENDOSCOPIC LINEAR CUTTER RELOADS GRAY 2.0 MM
Type: IMPLANTABLE DEVICE | Site: ABDOMEN | Status: FUNCTIONAL
Brand: ECHELON; ENDOPATH

## 2025-04-07 DEVICE — ABSORBABLE RELOAD
Type: IMPLANTABLE DEVICE | Site: ABDOMEN | Status: FUNCTIONAL
Brand: V-LOC 180

## 2025-04-07 DEVICE — ABSORBABLE HEMOSTAT (OXIDIZED REGENERATED CELLULOSE)
Type: IMPLANTABLE DEVICE | Site: ABDOMEN | Status: FUNCTIONAL
Brand: SURGICEL

## 2025-04-07 DEVICE — THE ECHELON, ECHELON ENDOPATH™ AND ECHELON FLEX™ FAMILIES OF ENDOSCOPIC LINEAR CUTTERS AND RELOADS ARE STERILE, SINGLE PATIENT USE INSTRUMENTS THAT SIMULTANEOUSLY CUT AND STAPLE TISSUE. THERE ARE SIX STAGGERED ROWS OF STAPLES, THREE ON EITHER SIDE OF THE CUT LINE. THE 45 MM INSTRUMENTS HAVE A STAPLE LINE THATIS APPROXIMATELY 45 MM LONG AND A CUT LINE THAT IS APPROXIMATELY 42 MM LONG. THE SHAFT CAN ROTATE FREELY IN BOTH DIRECTIONS AND AN ARTICULATION MECHANISM ON ARTICULATING INSTRUMENTS ENABLES BENDING THE DISTAL PORTIONOF THE SHAFT TO FACILITATE LATERAL ACCESS OF THE OPERATIVE SITE.THE INSTRUMENTS ARE SHIPPED WITHOUT A RELOAD AND MUST BE LOADED PRIOR TO USE. A STAPLE RETAINING CAP ON THE RELOAD PROTECTS THE STAPLE LEG POINTS DURING SHIPPING AND TRANSPORTATION. THE INSTRUMENTS’ LOCK-OUT FEATURE IS DESIGNED TO PREVENT A USED RELOAD FROM BEING REFIRED.
Type: IMPLANTABLE DEVICE | Site: ABDOMEN | Status: FUNCTIONAL
Brand: ECHELON ENDOPATH

## 2025-04-07 DEVICE — CELLULAR STAPLER WITH TRI-STAPLE TECHNOLOGY
Type: IMPLANTABLE DEVICE | Site: ABDOMEN | Status: FUNCTIONAL
Brand: EEA

## 2025-04-07 RX ORDER — CEFOXITIN 2 G/1
INJECTION, POWDER, FOR SOLUTION INTRAVENOUS AS NEEDED
Status: DISCONTINUED | OUTPATIENT
Start: 2025-04-07 | End: 2025-04-07 | Stop reason: SURG

## 2025-04-07 RX ORDER — ONDANSETRON 4 MG/1
4 TABLET, ORALLY DISINTEGRATING ORAL EVERY 6 HOURS PRN
Status: DISCONTINUED | OUTPATIENT
Start: 2025-04-07 | End: 2025-04-12 | Stop reason: HOSPADM

## 2025-04-07 RX ORDER — OXYCODONE AND ACETAMINOPHEN 7.5; 325 MG/1; MG/1
1 TABLET ORAL EVERY 4 HOURS PRN
Status: DISCONTINUED | OUTPATIENT
Start: 2025-04-07 | End: 2025-04-07 | Stop reason: HOSPADM

## 2025-04-07 RX ORDER — SODIUM CHLORIDE 0.9 % (FLUSH) 0.9 %
3 SYRINGE (ML) INJECTION EVERY 12 HOURS SCHEDULED
Status: DISCONTINUED | OUTPATIENT
Start: 2025-04-07 | End: 2025-04-07 | Stop reason: HOSPADM

## 2025-04-07 RX ORDER — HYDROCODONE BITARTRATE AND ACETAMINOPHEN 5; 325 MG/1; MG/1
1 TABLET ORAL EVERY 6 HOURS PRN
Refills: 0 | Status: DISCONTINUED | OUTPATIENT
Start: 2025-04-07 | End: 2025-04-09

## 2025-04-07 RX ORDER — SODIUM CHLORIDE 0.9 % (FLUSH) 0.9 %
3-10 SYRINGE (ML) INJECTION AS NEEDED
Status: DISCONTINUED | OUTPATIENT
Start: 2025-04-07 | End: 2025-04-07 | Stop reason: HOSPADM

## 2025-04-07 RX ORDER — DEXMEDETOMIDINE HYDROCHLORIDE 100 UG/ML
INJECTION, SOLUTION INTRAVENOUS AS NEEDED
Status: DISCONTINUED | OUTPATIENT
Start: 2025-04-07 | End: 2025-04-07 | Stop reason: SURG

## 2025-04-07 RX ORDER — LABETALOL HYDROCHLORIDE 5 MG/ML
5 INJECTION, SOLUTION INTRAVENOUS
Status: DISCONTINUED | OUTPATIENT
Start: 2025-04-07 | End: 2025-04-07 | Stop reason: HOSPADM

## 2025-04-07 RX ORDER — NALOXONE HCL 0.4 MG/ML
0.2 VIAL (ML) INJECTION AS NEEDED
Status: DISCONTINUED | OUTPATIENT
Start: 2025-04-07 | End: 2025-04-07 | Stop reason: HOSPADM

## 2025-04-07 RX ORDER — DROPERIDOL 2.5 MG/ML
0.62 INJECTION, SOLUTION INTRAMUSCULAR; INTRAVENOUS
Status: DISCONTINUED | OUTPATIENT
Start: 2025-04-07 | End: 2025-04-07 | Stop reason: HOSPADM

## 2025-04-07 RX ORDER — PROMETHAZINE HYDROCHLORIDE 25 MG/1
25 TABLET ORAL ONCE AS NEEDED
Status: DISCONTINUED | OUTPATIENT
Start: 2025-04-07 | End: 2025-04-07 | Stop reason: HOSPADM

## 2025-04-07 RX ORDER — SODIUM CHLORIDE, SODIUM LACTATE, POTASSIUM CHLORIDE, CALCIUM CHLORIDE 600; 310; 30; 20 MG/100ML; MG/100ML; MG/100ML; MG/100ML
9 INJECTION, SOLUTION INTRAVENOUS CONTINUOUS
Status: ACTIVE | OUTPATIENT
Start: 2025-04-07 | End: 2025-04-08

## 2025-04-07 RX ORDER — HYDRALAZINE HYDROCHLORIDE 20 MG/ML
5 INJECTION INTRAMUSCULAR; INTRAVENOUS
Status: DISCONTINUED | OUTPATIENT
Start: 2025-04-07 | End: 2025-04-07 | Stop reason: HOSPADM

## 2025-04-07 RX ORDER — PROMETHAZINE HYDROCHLORIDE 25 MG/1
25 SUPPOSITORY RECTAL ONCE AS NEEDED
Status: DISCONTINUED | OUTPATIENT
Start: 2025-04-07 | End: 2025-04-07 | Stop reason: HOSPADM

## 2025-04-07 RX ORDER — ACETAMINOPHEN 500 MG
1000 TABLET ORAL EVERY 6 HOURS
Status: DISCONTINUED | OUTPATIENT
Start: 2025-04-07 | End: 2025-04-08

## 2025-04-07 RX ORDER — FAMOTIDINE 10 MG/ML
20 INJECTION, SOLUTION INTRAVENOUS ONCE
Status: COMPLETED | OUTPATIENT
Start: 2025-04-07 | End: 2025-04-07

## 2025-04-07 RX ORDER — LIDOCAINE HYDROCHLORIDE 10 MG/ML
0.5 INJECTION, SOLUTION INFILTRATION; PERINEURAL ONCE AS NEEDED
Status: DISCONTINUED | OUTPATIENT
Start: 2025-04-07 | End: 2025-04-07 | Stop reason: HOSPADM

## 2025-04-07 RX ORDER — FENTANYL CITRATE 50 UG/ML
INJECTION, SOLUTION INTRAMUSCULAR; INTRAVENOUS AS NEEDED
Status: DISCONTINUED | OUTPATIENT
Start: 2025-04-07 | End: 2025-04-07 | Stop reason: SURG

## 2025-04-07 RX ORDER — DEXAMETHASONE SODIUM PHOSPHATE 4 MG/ML
INJECTION, SOLUTION INTRA-ARTICULAR; INTRALESIONAL; INTRAMUSCULAR; INTRAVENOUS; SOFT TISSUE AS NEEDED
Status: DISCONTINUED | OUTPATIENT
Start: 2025-04-07 | End: 2025-04-07 | Stop reason: SURG

## 2025-04-07 RX ORDER — LAMOTRIGINE 100 MG/1
200 TABLET ORAL EVERY MORNING
Status: DISCONTINUED | OUTPATIENT
Start: 2025-04-08 | End: 2025-04-08

## 2025-04-07 RX ORDER — DIPHENHYDRAMINE HYDROCHLORIDE 50 MG/ML
12.5 INJECTION, SOLUTION INTRAMUSCULAR; INTRAVENOUS
Status: DISCONTINUED | OUTPATIENT
Start: 2025-04-07 | End: 2025-04-07 | Stop reason: HOSPADM

## 2025-04-07 RX ORDER — VENLAFAXINE HYDROCHLORIDE 150 MG/1
150 CAPSULE, EXTENDED RELEASE ORAL 2 TIMES DAILY
Status: DISCONTINUED | OUTPATIENT
Start: 2025-04-07 | End: 2025-04-12 | Stop reason: HOSPADM

## 2025-04-07 RX ORDER — EPHEDRINE SULFATE 50 MG/ML
5 INJECTION, SOLUTION INTRAVENOUS ONCE AS NEEDED
Status: DISCONTINUED | OUTPATIENT
Start: 2025-04-07 | End: 2025-04-07 | Stop reason: HOSPADM

## 2025-04-07 RX ORDER — ONDANSETRON 2 MG/ML
4 INJECTION INTRAMUSCULAR; INTRAVENOUS EVERY 6 HOURS PRN
Status: DISCONTINUED | OUTPATIENT
Start: 2025-04-07 | End: 2025-04-12 | Stop reason: HOSPADM

## 2025-04-07 RX ORDER — FENTANYL CITRATE 50 UG/ML
50 INJECTION, SOLUTION INTRAMUSCULAR; INTRAVENOUS ONCE AS NEEDED
Status: DISCONTINUED | OUTPATIENT
Start: 2025-04-07 | End: 2025-04-07 | Stop reason: HOSPADM

## 2025-04-07 RX ORDER — IPRATROPIUM BROMIDE AND ALBUTEROL SULFATE 2.5; .5 MG/3ML; MG/3ML
3 SOLUTION RESPIRATORY (INHALATION) ONCE AS NEEDED
Status: DISCONTINUED | OUTPATIENT
Start: 2025-04-07 | End: 2025-04-07 | Stop reason: HOSPADM

## 2025-04-07 RX ORDER — ONDANSETRON 2 MG/ML
INJECTION INTRAMUSCULAR; INTRAVENOUS AS NEEDED
Status: DISCONTINUED | OUTPATIENT
Start: 2025-04-07 | End: 2025-04-07 | Stop reason: SURG

## 2025-04-07 RX ORDER — HYDROMORPHONE HYDROCHLORIDE 1 MG/ML
0.5 INJECTION, SOLUTION INTRAMUSCULAR; INTRAVENOUS; SUBCUTANEOUS
Status: DISCONTINUED | OUTPATIENT
Start: 2025-04-07 | End: 2025-04-07 | Stop reason: HOSPADM

## 2025-04-07 RX ORDER — ATROPINE SULFATE 0.4 MG/ML
0.4 INJECTION, SOLUTION INTRAMUSCULAR; INTRAVENOUS; SUBCUTANEOUS ONCE AS NEEDED
Status: DISCONTINUED | OUTPATIENT
Start: 2025-04-07 | End: 2025-04-07 | Stop reason: HOSPADM

## 2025-04-07 RX ORDER — KETOROLAC TROMETHAMINE 15 MG/ML
15 INJECTION, SOLUTION INTRAMUSCULAR; INTRAVENOUS EVERY 6 HOURS
Status: COMPLETED | OUTPATIENT
Start: 2025-04-07 | End: 2025-04-09

## 2025-04-07 RX ORDER — PHENYLEPHRINE HYDROCHLORIDE 10 MG/ML
INJECTION INTRAVENOUS AS NEEDED
Status: DISCONTINUED | OUTPATIENT
Start: 2025-04-07 | End: 2025-04-07 | Stop reason: SURG

## 2025-04-07 RX ORDER — MIDAZOLAM HYDROCHLORIDE 1 MG/ML
1 INJECTION, SOLUTION INTRAMUSCULAR; INTRAVENOUS
Status: DISCONTINUED | OUTPATIENT
Start: 2025-04-07 | End: 2025-04-07 | Stop reason: HOSPADM

## 2025-04-07 RX ORDER — FENTANYL CITRATE 50 UG/ML
50 INJECTION, SOLUTION INTRAMUSCULAR; INTRAVENOUS
Status: DISCONTINUED | OUTPATIENT
Start: 2025-04-07 | End: 2025-04-07 | Stop reason: HOSPADM

## 2025-04-07 RX ORDER — MAGNESIUM HYDROXIDE 1200 MG/15ML
LIQUID ORAL AS NEEDED
Status: DISCONTINUED | OUTPATIENT
Start: 2025-04-07 | End: 2025-04-07 | Stop reason: HOSPADM

## 2025-04-07 RX ORDER — FLUMAZENIL 0.1 MG/ML
0.2 INJECTION INTRAVENOUS AS NEEDED
Status: DISCONTINUED | OUTPATIENT
Start: 2025-04-07 | End: 2025-04-07 | Stop reason: HOSPADM

## 2025-04-07 RX ORDER — TRAMADOL HYDROCHLORIDE 50 MG/1
50 TABLET ORAL EVERY 6 HOURS PRN
Status: DISCONTINUED | OUTPATIENT
Start: 2025-04-07 | End: 2025-04-12 | Stop reason: HOSPADM

## 2025-04-07 RX ORDER — SODIUM CHLORIDE 9 MG/ML
100 INJECTION, SOLUTION INTRAVENOUS CONTINUOUS
Status: DISCONTINUED | OUTPATIENT
Start: 2025-04-07 | End: 2025-04-09

## 2025-04-07 RX ORDER — HYDROCODONE BITARTRATE AND ACETAMINOPHEN 5; 325 MG/1; MG/1
1 TABLET ORAL ONCE AS NEEDED
Status: DISCONTINUED | OUTPATIENT
Start: 2025-04-07 | End: 2025-04-07 | Stop reason: HOSPADM

## 2025-04-07 RX ORDER — ROCURONIUM BROMIDE 10 MG/ML
INJECTION, SOLUTION INTRAVENOUS AS NEEDED
Status: DISCONTINUED | OUTPATIENT
Start: 2025-04-07 | End: 2025-04-07 | Stop reason: SURG

## 2025-04-07 RX ORDER — PROPOFOL 10 MG/ML
VIAL (ML) INTRAVENOUS AS NEEDED
Status: DISCONTINUED | OUTPATIENT
Start: 2025-04-07 | End: 2025-04-07 | Stop reason: SURG

## 2025-04-07 RX ORDER — ONDANSETRON 2 MG/ML
4 INJECTION INTRAMUSCULAR; INTRAVENOUS ONCE AS NEEDED
Status: COMPLETED | OUTPATIENT
Start: 2025-04-07 | End: 2025-04-07

## 2025-04-07 RX ORDER — ALBUMIN HUMAN 50 G/1000ML
SOLUTION INTRAVENOUS CONTINUOUS PRN
Status: DISCONTINUED | OUTPATIENT
Start: 2025-04-07 | End: 2025-04-07 | Stop reason: SURG

## 2025-04-07 RX ORDER — NALOXONE HCL 0.4 MG/ML
0.4 VIAL (ML) INJECTION
Status: DISCONTINUED | OUTPATIENT
Start: 2025-04-07 | End: 2025-04-12 | Stop reason: HOSPADM

## 2025-04-07 RX ORDER — TEMAZEPAM 7.5 MG/1
15 CAPSULE ORAL NIGHTLY PRN
Status: DISCONTINUED | OUTPATIENT
Start: 2025-04-07 | End: 2025-04-12 | Stop reason: HOSPADM

## 2025-04-07 RX ORDER — ENOXAPARIN SODIUM 100 MG/ML
40 INJECTION SUBCUTANEOUS DAILY
Status: DISCONTINUED | OUTPATIENT
Start: 2025-04-08 | End: 2025-04-12 | Stop reason: HOSPADM

## 2025-04-07 RX ORDER — BUPIVACAINE HYDROCHLORIDE AND EPINEPHRINE 2.5; 5 MG/ML; UG/ML
INJECTION, SOLUTION EPIDURAL; INFILTRATION; INTRACAUDAL; PERINEURAL AS NEEDED
Status: DISCONTINUED | OUTPATIENT
Start: 2025-04-07 | End: 2025-04-07 | Stop reason: HOSPADM

## 2025-04-07 RX ORDER — HYDROMORPHONE HYDROCHLORIDE 1 MG/ML
0.5 INJECTION, SOLUTION INTRAMUSCULAR; INTRAVENOUS; SUBCUTANEOUS
Status: DISCONTINUED | OUTPATIENT
Start: 2025-04-07 | End: 2025-04-12 | Stop reason: HOSPADM

## 2025-04-07 RX ADMIN — FENTANYL CITRATE 50 MCG: 50 INJECTION, SOLUTION INTRAMUSCULAR; INTRAVENOUS at 14:23

## 2025-04-07 RX ADMIN — SODIUM CHLORIDE, POTASSIUM CHLORIDE, SODIUM LACTATE AND CALCIUM CHLORIDE 9 ML/HR: 600; 310; 30; 20 INJECTION, SOLUTION INTRAVENOUS at 07:24

## 2025-04-07 RX ADMIN — SODIUM CHLORIDE 100 ML/HR: 9 INJECTION, SOLUTION INTRAVENOUS at 18:26

## 2025-04-07 RX ADMIN — HYDROMORPHONE HYDROCHLORIDE 0.5 MG: 1 INJECTION, SOLUTION INTRAMUSCULAR; INTRAVENOUS; SUBCUTANEOUS at 17:25

## 2025-04-07 RX ADMIN — HYDROMORPHONE HYDROCHLORIDE 0.5 MG: 1 INJECTION, SOLUTION INTRAMUSCULAR; INTRAVENOUS; SUBCUTANEOUS at 08:15

## 2025-04-07 RX ADMIN — SUGAMMADEX 100 MG: 100 INJECTION, SOLUTION INTRAVENOUS at 13:41

## 2025-04-07 RX ADMIN — SUGAMMADEX 100 MG: 100 INJECTION, SOLUTION INTRAVENOUS at 13:42

## 2025-04-07 RX ADMIN — FENTANYL CITRATE 50 MCG: 50 INJECTION, SOLUTION INTRAMUSCULAR; INTRAVENOUS at 14:38

## 2025-04-07 RX ADMIN — ONDANSETRON 4 MG: 2 INJECTION, SOLUTION INTRAMUSCULAR; INTRAVENOUS at 23:39

## 2025-04-07 RX ADMIN — PHENYLEPHRINE HYDROCHLORIDE 100 MCG: 10 INJECTION INTRAVENOUS at 11:42

## 2025-04-07 RX ADMIN — PROPOFOL 200 MG: 10 INJECTION, EMULSION INTRAVENOUS at 07:40

## 2025-04-07 RX ADMIN — ACETAMINOPHEN 1000 MG: 500 TABLET, FILM COATED ORAL at 18:26

## 2025-04-07 RX ADMIN — ROCURONIUM BROMIDE 50 MG: 10 INJECTION INTRAVENOUS at 07:40

## 2025-04-07 RX ADMIN — FENTANYL CITRATE 50 MCG: 50 INJECTION, SOLUTION INTRAMUSCULAR; INTRAVENOUS at 07:40

## 2025-04-07 RX ADMIN — FENTANYL CITRATE 50 MCG: 50 INJECTION, SOLUTION INTRAMUSCULAR; INTRAVENOUS at 16:26

## 2025-04-07 RX ADMIN — ROCURONIUM BROMIDE 20 MG: 10 INJECTION INTRAVENOUS at 08:25

## 2025-04-07 RX ADMIN — MIDAZOLAM 1 MG: 1 INJECTION INTRAMUSCULAR; INTRAVENOUS at 07:24

## 2025-04-07 RX ADMIN — HYDROMORPHONE HYDROCHLORIDE 0.5 MG: 1 INJECTION, SOLUTION INTRAMUSCULAR; INTRAVENOUS; SUBCUTANEOUS at 15:51

## 2025-04-07 RX ADMIN — DEXMEDETOMIDINE HYDROCHLORIDE 10 MCG: 100 INJECTION, SOLUTION INTRAVENOUS at 07:47

## 2025-04-07 RX ADMIN — DEXAMETHASONE SODIUM PHOSPHATE 8 MG: 4 INJECTION, SOLUTION INTRAMUSCULAR; INTRAVENOUS at 07:45

## 2025-04-07 RX ADMIN — FAMOTIDINE 20 MG: 10 INJECTION, SOLUTION INTRAVENOUS at 07:24

## 2025-04-07 RX ADMIN — CEFOXITIN SODIUM 2 G: 2 POWDER, FOR SOLUTION INTRAVENOUS at 12:48

## 2025-04-07 RX ADMIN — ALBUMIN (HUMAN): 12.5 INJECTION, SOLUTION INTRAVENOUS at 12:44

## 2025-04-07 RX ADMIN — HYDROMORPHONE HYDROCHLORIDE 0.5 MG: 1 INJECTION, SOLUTION INTRAMUSCULAR; INTRAVENOUS; SUBCUTANEOUS at 09:24

## 2025-04-07 RX ADMIN — DROPERIDOL 0.62 MG: 2.5 INJECTION, SOLUTION INTRAMUSCULAR; INTRAVENOUS at 14:42

## 2025-04-07 RX ADMIN — ALBUMIN (HUMAN): 12.5 INJECTION, SOLUTION INTRAVENOUS at 12:55

## 2025-04-07 RX ADMIN — FENTANYL CITRATE 50 MCG: 50 INJECTION, SOLUTION INTRAMUSCULAR; INTRAVENOUS at 13:06

## 2025-04-07 RX ADMIN — ONDANSETRON 4 MG: 2 INJECTION, SOLUTION INTRAMUSCULAR; INTRAVENOUS at 13:34

## 2025-04-07 RX ADMIN — HYDROMORPHONE HYDROCHLORIDE 1 MG: 1 INJECTION, SOLUTION INTRAMUSCULAR; INTRAVENOUS; SUBCUTANEOUS at 09:40

## 2025-04-07 RX ADMIN — CEFOXITIN SODIUM 2 G: 2 POWDER, FOR SOLUTION INTRAVENOUS at 10:36

## 2025-04-07 RX ADMIN — ROCURONIUM BROMIDE 20 MG: 10 INJECTION INTRAVENOUS at 10:58

## 2025-04-07 RX ADMIN — SUGAMMADEX 100 MG: 100 INJECTION, SOLUTION INTRAVENOUS at 13:40

## 2025-04-07 RX ADMIN — DEXMEDETOMIDINE HYDROCHLORIDE 10 MCG: 100 INJECTION, SOLUTION INTRAVENOUS at 07:40

## 2025-04-07 RX ADMIN — ROCURONIUM BROMIDE 20 MG: 10 INJECTION INTRAVENOUS at 09:24

## 2025-04-07 RX ADMIN — HYDROMORPHONE HYDROCHLORIDE 0.5 MG: 1 INJECTION, SOLUTION INTRAMUSCULAR; INTRAVENOUS; SUBCUTANEOUS at 21:08

## 2025-04-07 RX ADMIN — VENLAFAXINE HYDROCHLORIDE 150 MG: 150 CAPSULE, EXTENDED RELEASE ORAL at 21:10

## 2025-04-07 RX ADMIN — PHENYLEPHRINE HYDROCHLORIDE 100 MCG: 10 INJECTION INTRAVENOUS at 12:34

## 2025-04-07 RX ADMIN — ROCURONIUM BROMIDE 20 MG: 10 INJECTION INTRAVENOUS at 11:45

## 2025-04-07 RX ADMIN — FENTANYL CITRATE 50 MCG: 50 INJECTION, SOLUTION INTRAMUSCULAR; INTRAVENOUS at 09:51

## 2025-04-07 RX ADMIN — ROCURONIUM BROMIDE 10 MG: 10 INJECTION INTRAVENOUS at 10:11

## 2025-04-07 RX ADMIN — HYDROMORPHONE HYDROCHLORIDE 0.5 MG: 1 INJECTION, SOLUTION INTRAMUSCULAR; INTRAVENOUS; SUBCUTANEOUS at 16:13

## 2025-04-07 RX ADMIN — CEFAZOLIN 2000 MG: 2 INJECTION, POWDER, FOR SOLUTION INTRAMUSCULAR; INTRAVENOUS at 07:28

## 2025-04-07 RX ADMIN — HYDROMORPHONE HYDROCHLORIDE 0.5 MG: 1 INJECTION, SOLUTION INTRAMUSCULAR; INTRAVENOUS; SUBCUTANEOUS at 13:34

## 2025-04-07 RX ADMIN — HYDROCODONE BITARTRATE AND ACETAMINOPHEN 1 TABLET: 5; 325 TABLET ORAL at 23:20

## 2025-04-07 RX ADMIN — ONDANSETRON 4 MG: 2 INJECTION, SOLUTION INTRAMUSCULAR; INTRAVENOUS at 14:27

## 2025-04-07 RX ADMIN — HYDROMORPHONE HYDROCHLORIDE 0.5 MG: 1 INJECTION, SOLUTION INTRAMUSCULAR; INTRAVENOUS; SUBCUTANEOUS at 14:58

## 2025-04-07 RX ADMIN — ROCURONIUM BROMIDE 10 MG: 10 INJECTION INTRAVENOUS at 12:56

## 2025-04-07 RX ADMIN — KETOROLAC TROMETHAMINE 15 MG: 15 INJECTION, SOLUTION INTRAMUSCULAR; INTRAVENOUS at 18:26

## 2025-04-07 RX ADMIN — FENTANYL CITRATE 50 MCG: 50 INJECTION, SOLUTION INTRAMUSCULAR; INTRAVENOUS at 08:04

## 2025-04-07 NOTE — CONSULTS
Cc: Sigmoid colon diverticulitis, IntraOp consult    HPI: Patient is a 45-year-old female that is here in the operative room for hysterectomy due to chronic pelvic pain.   I was consulted intra op because the sigmoid colon was inflamed and attached to the uterus.  As per significant other patient has been having pelvic pain now for several months.  She underwent CT scan of the abdomen and pelvis that was done in February that show possible sigmoid colitis diverticulitis.  Patient was treated with antibiotics.  She continued to have pain.  She was referred to Dr. Mcintyre for evaluation.  She was taken to the operative room for laparoscopic hysterectomy.    Intraoperatively, the  sigmoid colon was found to be stuck to the superior aspect of the uterus.  I was called intraoperatively to help with dissection.  We discussed with Dr. Mcintyre that she likely had diverticulitis and may have had contained perforation to the uterus.  He felt patient needed hysterectomy so I proceeded with dissection of the attachments of the colon to the uterus.    I scrubbed in initially and dissection of the sigmoid colon from the uterus was performed.  There was clear evidence of perforation of the sigmoid colon after the colon was dissected.    At this time I and scrub and spoke with the family about the issue.  Discussed with them that patient likely had diverticulitis with contained perforation that had created inflammatory reaction against the uterus.  She now has a small perforation likely perforated diverticuli that had been contained.  I recommend that we  proceed with a laparoscopic sigmoid colectomy with possible ostomy.  Patient significant other and father understand about the increased risk of anastomotic leak  none prep colon.  Risk and benefits of procedure including bleeding, infection, ureteral injury, anastomotic leak, abscess formation, the need for further operation discussed in detail with both that signed consent for the  patient, verbalized understanding and agreed with the plan    We will proceed with laparoscopic sigmoid colectomy after hysterectomy is completed.    Ross Cramer MD, FACS  General, Minimally Invasive and Endoscopic Surgery  [facility]    [address], [address]  [place], [place], [address]  P: [phone]  F: [phone]

## 2025-04-07 NOTE — ANESTHESIA PREPROCEDURE EVALUATION
Anesthesia Evaluation     Patient summary reviewed and Nursing notes reviewed   no history of anesthetic complications:   NPO Solid Status: > 8 hours  NPO Liquid Status: > 2 hours           Airway   Mallampati: II  TM distance: >3 FB  Neck ROM: full  Dental - normal exam     Pulmonary - normal exam   (-) COPD, asthma, not a smoker  Cardiovascular - normal exam  Exercise tolerance: good (4-7 METS)    (-) past MI, dysrhythmias, angina      Neuro/Psych  (+) psychiatric history Anxiety  (-) seizures, CVA    ROS Comment: On adderall  GI/Hepatic/Renal/Endo    (+) obesity, thyroid problem   (-) GERD, liver disease, no renal disease, diabetes    Musculoskeletal     Abdominal    Substance History      OB/GYN          Other                        Anesthesia Plan    ASA 2     general     intravenous induction     Anesthetic plan, risks, benefits, and alternatives have been provided, discussed and informed consent has been obtained with: patient.      CODE STATUS:

## 2025-04-07 NOTE — PLAN OF CARE
Goal Outcome Evaluation:  Plan of Care Reviewed With: patient        Progress: improving  Outcome Evaluation: Received pt from PACU, on 2L nasal cannula, IVF infusing, CHIOMA X 1, lap sites are RE, significant other at bedside

## 2025-04-07 NOTE — ANESTHESIA POSTPROCEDURE EVALUATION
Patient: Deedee Lang    Procedure Summary       Date: 04/07/25 Room / Location: [facility] OR [address] [place] [address] MAIN OR    Anesthesia Start: 0732 Anesthesia Stop: 1408    Procedures:       TOTAL LAPAROSCOPIC HYSTERECTOMY BILATERAL SALPINGECTOMY (Bilateral: Abdomen)      COLON RESECTION LAPAROSCOPIC SIGMOID (Abdomen) Diagnosis:       Pelvic pain      (Pelvic pain [R10.2])    Surgeons: Percy Mcintyre MD; Ross Cramer MD Provider: Tuan Reed MD    Anesthesia Type: general ASA Status: 2            Anesthesia Type: general    Vitals  Vitals Value Taken Time   /79 04/07/25 16:00   Temp 36.4 °C (97.6 °F) 04/07/25 14:05   Pulse 95 04/07/25 16:02   Resp 19 04/07/25 14:05   SpO2 98 % 04/07/25 16:02   Vitals shown include unfiled device data.        Post Anesthesia Care and Evaluation    Patient location during evaluation: bedside  Level of consciousness: awake  Pain management: adequate    Airway patency: patent  Anesthetic complications: No anesthetic complications    Cardiovascular status: acceptable  Respiratory status: acceptable  Hydration status: acceptable    Comments: */87   Pulse 107   Temp 36.4 °C (97.6 °F) (Oral)   Resp 19   LMP 03/31/2025 (Exact Date)   SpO2 100%

## 2025-04-07 NOTE — OP NOTE
Date of procedure: 4/7/2025    Primary Surgeon: Dr. Cramer    Assistant: Samara Garcia PA-C that was in charge of holding camera, retraction, closing wounds and placing dressings that was essential for success of the case    Preoperative diagnosis: Pelvic pain    Postoperative diagnosis: Sigmoid colon diverticulitis with contained perforation    Procedure performed: Laparoscopic sigmoid colectomy    Anesthesia: General    Complications: None    Findings: Contained sigmoid colon perforation against the uterus    Condition of patient: Stable to recovery    Specimen: Sigmoid colon    Indications: 45-year-old female that was taken to operative room by Dr. Mcintyre for chronic pelvic pain.  Patient was found to have inflammatory phlegmon of the sigmoid colon over the uterus.  I was consulted IntraOp for evaluation.  We discussed about treatment options that would include aborting the procedure and keeping the patient on antibiotics in hopes of resolution of diverticulitis versus proceeding with hysterectomy.  Because of chronic pain Dr. Mcintyre felt that the patient will benefit from hysterectomy.  I scrubbed in and when dissecting the sigmoid colon from the uterus a contained perforation was found.  We discussed with the family then about the issue and the need for the patient to have sigmoid colon resection.  Risk and benefits of procedure including bleeding, infection, wound complications, anastomotic leak, ureteral injury and the need for further operation discussed with the patient significant other as well as her father that verbalized understanding and agreed with the plan    Description:   After hysterectomy was completed I explored abdominal cavity.  There was evidence of bleeding from the vaginal cuff.  We assessed the rectal stump with serial dilators to ensure that there was no large amount of stool that would prevent safely performing an anastomosis.  Serial dilators were introduced and advanced all the way up  to the sigmoid colon that there was no significant amount of stool. This was controlled with harmonic ultrasonic junaid.  There was evidence of a perforated segment of the proximal sigmoid colon.  I then added another 5 mm trocar in the right mid abdomen and a right lower quadrant 5 mm trocar previously placed by gynecology service was replaced by 12 mm trocar.  We started the procedure by taking the peritoneal attachment over the right side of the mesentery at the level of the sacral promontory.  Dissection medial to lateral was performed and the ureter was recognized.  I then continue dissection of the sigmoid colon against the attachments to the lateral abdominal wall.  This was done with harmonic ultrasonic junaid.  The ureter was recognized and preserved.  Dissection continue superiorly by taking the attachments from the sigmoid colon and the descending colon to the Toltz fascia.  The colon was mobilized lateral to medial all the way up  to the splenic flexure that was now completely mobilized.  I then proceeded to transect the peritoneal attachments of the superior rectum at the pelvis with harmonic ultrasonic junaid.  Upper rectum was skeletonized and then transected with [place] blue load stapler.  Mesentery of the sigmoid colon was then transected superiorly and inferior mesenteric artery was skeletonized and resected with [place] busby load stapler.  I then selected a point at the distal descending colon for transection so mesentery transection was taken all the way up to the point.  Then a right lower quadrant incision was performed with scalpel and dissection was carried into the subcutaneous tissue and anterior rectus sheath fascia was transected.  Rectus muscle was split.  Posterior rectus sheath was entered.  Wound retractor was placed.  The specimen was brought out through the right lower quadrant incision and the distal descending colon was transected.  Blood flow of the descending colon was assessed  with Doppler and this was found to be adequate.  I then performed pursestring of the distal descending colon and 28 mm circular anvil for stapler was introduced and secured in place.  I then added  atrial millimeter trocar in the supra umbilical area to allow better visualization of the pelvis.  An  end-to-end colorectal anastomosis was performed with 28 mm circular stapler.  There were 2 healthy donuts and the proximal and distal end.  Insufflation of the anastomosis did not show any evidence of leak under water.  Then abdominal cavity was then reinsufflated and there was an area of bleeding at the omentum of the transverse colon that had been taken down priorly.  This was transected with ultrasonic junaid.  There was no more evidence of bleeding.  I then decided to place an 18 Arabic Abelardo drain through the right lower quadrant trocar that was removed through the right upper quadrant trocar and secured in place with 3-0 nylon.  Then the right lower quadrant 12 mm trocar was removed and the defect was closed with 0 Vicryl Endo Close technique.  The supraumbilical 12 mm trocar was removed and the defect was closed with 0 Vicryl Endo Close technique.  The [place] trocar that had been placed originally by Dr. Mcintyre was removed and the fascial sutures were tied.  Pneumoperitoneum was completely desufflated after all the trocars were removed.  I then proceeded to close the right lower quadrant incision with running 2-0 Vicryl for the posterior rectus sheath as well as running #1 PDS for anterior rectus sheath.  All incisions were closed with 4-0 Monocryl and surgical glue.  The patient tolerated the procedure well and she was sent to recovery in stable condition.    Ross Cramer MD, FACS  General, Minimally Invasive and Endoscopic Surgery  [facility]    [address], [address]  [place], [place], [address]  P: [phone]  F: [phone]

## 2025-04-07 NOTE — H&P
[facility]   HISTORY AND PHYSICAL    Patient Name: Deedee Lang  : 1980  MRN: [id number]  Primary Care Physician:  Aniya Garcia APRN  Date of admission: 2025    Subjective   Subjective     Chief Complaint: pelvic pain    History of Present Illness - Patient is a 45 year old female with ongoing pelvic pain and evidence of hematocolpos on ultrasound.  Despite conservative measures, pain persists and patient would like to proceed with hysterectomy.    Review of Systems   Constitutional:  Negative for chills and fever.   Respiratory:  Negative for shortness of breath.    Cardiovascular:  Negative for chest pain.   Genitourinary:  Positive for pelvic pain and vaginal pain.        Personal History     Past Medical History:   Diagnosis Date    Abnormal Pap smear of cervix     ADHD     Anxiety     Bipolar disorder     COVID-19 2021    Depression     Disease of thyroid gland     Diverticulitis     PMDD (premenstrual dysphoric disorder)     Sjogren's syndrome        Past Surgical History:   Procedure Laterality Date    APPENDECTOMY      BREAST AUGMENTATION       SECTION      D & C HYSTEROSCOPY ENDOMETRIAL ABLATION N/A 2023    Procedure: DILATATION AND CURETTAGE HYSTEROSCOPY NOVASURE ENDOMETRIAL ABLATION;  Surgeon: Percy Mcintyre MD;  Location: [facility] OR;  Service: Obstetrics/Gynecology;  Laterality: N/A;    TUBAL ABDOMINAL LIGATION         Family History: family history includes Alzheimer's disease in her maternal grandmother; Bone cancer in her paternal grandfather; Colon cancer in her paternal grandmother; Depression in her father and mother; Diabetes in her father; Dystonia in her mother; Muscular dystrophy in her maternal grandfather and maternal grandmother. Otherwise pertinent FHx was reviewed and not pertinent to current issue.    Social History:  reports that she has never smoked. She does not have any smokeless tobacco history on file. She reports that she does  not currently use alcohol. She reports that she does not use drugs.    Home Medications:  HYDROcodone-acetaminophen, Magnesium Oxide -Mg Supplement, acetaminophen, amphetamine-dextroamphetamine XR, clonazePAM, dicyclomine, docusate sodium, lamoTRIgine, polyethylene glycol, rizatriptan MLT, sennosides-docusate, traMADol HCl, tretinoin, and venlafaxine XR    Allergies:  Allergies   Allergen Reactions    Codeine Nausea Only    Gluten Meal Other (See Comments)     BLOATING AND DIARRHEA       Objective    Objective     Vitals:        Physical Exam  Vitals and nursing note reviewed.   Constitutional:       Appearance: Normal appearance.   HENT:      Right Ear: External ear normal.      Left Ear: External ear normal.   Cardiovascular:      Rate and Rhythm: Normal rate.   Pulmonary:      Effort: Pulmonary effort is normal.   Abdominal:      Tenderness: There is no guarding or rebound.   Musculoskeletal:      Cervical back: Normal range of motion.   Skin:     General: Skin is warm.   Neurological:      General: No focal deficit present.      Mental Status: She is alert.   Psychiatric:         Mood and Affect: Mood normal.         Behavior: Behavior normal.         Thought Content: Thought content normal.         Judgment: Judgment normal.         Result Review      uCG negative.    WBC 11.57 High  10*3/mm3   RBC 4.19 10*6/mm3   Hemoglobin 12.7 g/dL   Hematocrit 37.2 %   MCV 88.8 fL   MCH 30.3 pg   MCHC 34.1 g/dL   RDW 12.3 %   RDW-SD 40.2 fl   MPV 9.9 fL   Platelets 334 10*3/mm3     Glucose 88 mg/dL   BUN 7 mg/dL   Creatinine 0.74 mg/dL   Sodium 140 mmol/L   Potassium 3.8 mmol/L   Chloride 102 mmol/L   CO2 24.5 mmol/L   Calcium 9.4 mg/dL   BUN/Creatinine Ratio 9.5    Anion Gap 13.5 mmol/L   eGFR 102.5 mL/min/1.73       Assessment & Plan   Assessment / Plan     Brief Patient Summary:  Deedee Lang is a 45 y.o. female with chronic pelvic pain    Active Hospital Problems:  Active Hospital Problems    Diagnosis     **Pelvic  pain      Plan:   - Patient for laparoscopic hysterectomy.  I discussed nature of procedure as well as risks including bleeding, infection, injury to internal organs, anesthesia.  Patient voices understanding and agrees to proceed.    VTE Prophylaxis:  Mechanical VTE prophylaxis orders are present.      CODE STATUS:       Admission Status:  I believe this patient meets outpatient status.    Percy Mcintyre MD

## 2025-04-07 NOTE — OP NOTE
Operative Note    Date of Surgery:  4/7/25    Pre-operative Diagnosis:  Chronic Pelvic Pain    Post-operative Diagnosis:  Chronic Pelvic Pain and Diverticular Disease    Procedure:  Total Laparoscopic Hysterectomy with Bilateral Salpingectomy.    Primary Surgeon:  Percy Mcintyre MD    Assistant Surgeon:  Andres Peñaloza MD    Consulting Surgeon:  Ross Cramer MD    Anesthesia:  General    Findings:  Enlarged globular uterus with grossly normal ovaries.  Patient with omental adhesions to anterior abdominal wall and adhesions of large bowel to left pelvic side wall and uterus, with likely previous inflammation/perforation.   Dr Cramer with general surgery was consulted to evaluate and treat large bowel adhesions.    Description of Procedure:   Patient was taken to operating room. After adequate general anesthesia was administered, the patient was placed on OR table in dorsal  lithotomy position in [facility]. Patient identified with two identifiers and timeout performed with all team members agreeing to the planned procedure.  Abdomen, vagina, perineum, and rectum  was prepped and draped in a normal, sterile fashion.     A bivalve speculum was placed in the vagina.  The cervix was visualized and anterior lip grasped with single tooth tenaculum.  The uterus was sounded to 9 centimeters with the Teague sound.  The  uterine manipulator was then placed.  A Morel catheter was inserted.  All other instruments were removed from the vagina and attention was turned towards the abdominal portion.    A 1 centimeter infraumbilical incision was made with the knife.  The fascia was grasped with Kocher clamps x2 and elevated and incised with curved Mayos.  The peritoneal cavity was entered bluntly.  A 10 mm Iraida trocar was then placed.  2 stay sutures of 0 Vicryl were placed at either side in the fascia.  The patient was then placed in Trendelenburg position.  A 10 millimeter 0 degree scope was passed through  the port.  Inspection of the pelvic and abdominal cavity revealed the findings as noted above.  Accessory port sites were placed one in the left lower quadrant and one in the right lower quadrant.  Each was a 5 millimeter disposable port.  These were placed under direct laparoscopic visualization without injury noted.      Patient had significant omental adhesions involving the anterior abdominal wall.  Use a combination of blunt dissection and use of Ligasure, the adhesions were removed without difficulty.  Further inspection revealed significant adhesions involving the sigmoid colon to the pelvic sidewall and cornua of the left part of the uterus.  We consulted Dr Cramer with General Surgery.  His notes can be obtained to review the details of his intraoperative consult, which resulted in colon resection.  Both Dr Cramer and I scrubbed out of surgery to discuss intraoperative findings with patient's significant other and her father.  Discussed need for partial resection of colon due to perforation and they agreed that this would be patient's wish for the most optimal surgical outcome.  Discussed that failure to resect perforated colon could result in significant infection and morbidity.  Family agreed that surgical endeavor should be undertaken for the patient and her consents were signed.      However, prior to resection, it was decided to proceed with hysterectomy first.  The ureter was identified on the left side and appeared to be well away from the line of transection.  The left tube was mobilized and removed from its attachment to the Broad Ligament using  the LigaSure Advance device.  The left round ligament was then coagulated and transected with the LigaSure.  The broad ligament was dissected into the anterior and posterior leaflets.  The anterior leaflet was further dissected with the LigaSure and the bladder flap was dissected off inferiorly.  Notably, the bladder was adherent to the lower uterine segment  due to previous .  Then in a similar fashion the posterior leaflet was dissected and the uterine artery on the left side was skeletonized.  The uterine artery was then taken with the ligasure device.  This process was repeated on the right side in a similar fashion.  Attention was then turned back towards the bladder peritoneum which was further dissected off the cervical vaginal fascia.  Anterior colpotomy was then made with the monopolar tip of the LigaSure device.  Entry into the vagina was confirmed by visualization of the uterine manipulator cup.  Then followed the colpotomy around laterally until the specimen was completely excised.  Specimen was passed through the vagina.  The pelvic cavity was irrigated.  The vaginal cuff was then reapproximated with the V-lock barbed delayed absorbable suture.   After completion of the cuff closure, the pelvic cavity was again irrigated.  Good hemostasis was noted.   The remainder of the surgical endeavor can be reviewed thru Dr Cramer's notes.    EBL:  25 cc    Specimens:  Uterus and bilateral uterine tubes.    Complications:  Adhesions involving sigmoid and omentum.  Likely previous perforation of large bowel with adhesion to uterus.    Disposition:  To PACU    Percy Mcintyre MD

## 2025-04-08 LAB
ANION GAP SERPL CALCULATED.3IONS-SCNC: 9.5 MMOL/L (ref 5–15)
BASOPHILS # BLD AUTO: 0.01 10*3/MM3 (ref 0–0.2)
BASOPHILS NFR BLD AUTO: 0.1 % (ref 0–1.5)
BUN SERPL-MCNC: 5 MG/DL (ref 6–20)
BUN/CREAT SERPL: 7.2 (ref 7–25)
CALCIUM SPEC-SCNC: 8.9 MG/DL (ref 8.6–10.5)
CHLORIDE SERPL-SCNC: 101 MMOL/L (ref 98–107)
CO2 SERPL-SCNC: 26.5 MMOL/L (ref 22–29)
CREAT SERPL-MCNC: 0.69 MG/DL (ref 0.57–1)
DEPRECATED RDW RBC AUTO: 39.3 FL (ref 37–54)
EGFRCR SERPLBLD CKD-EPI 2021: 109.9 ML/MIN/1.73
EOSINOPHIL # BLD AUTO: 0 10*3/MM3 (ref 0–0.4)
EOSINOPHIL NFR BLD AUTO: 0 % (ref 0.3–6.2)
ERYTHROCYTE [DISTWIDTH] IN BLOOD BY AUTOMATED COUNT: 12.1 % (ref 12.3–15.4)
GLUCOSE SERPL-MCNC: 101 MG/DL (ref 65–99)
HCT VFR BLD AUTO: 36.1 % (ref 34–46.6)
HGB BLD-MCNC: 12.3 G/DL (ref 12–15.9)
IMM GRANULOCYTES # BLD AUTO: 0.05 10*3/MM3 (ref 0–0.05)
IMM GRANULOCYTES NFR BLD AUTO: 0.4 % (ref 0–0.5)
LYMPHOCYTES # BLD AUTO: 2.62 10*3/MM3 (ref 0.7–3.1)
LYMPHOCYTES NFR BLD AUTO: 19.8 % (ref 19.6–45.3)
MCH RBC QN AUTO: 30.3 PG (ref 26.6–33)
MCHC RBC AUTO-ENTMCNC: 34.1 G/DL (ref 31.5–35.7)
MCV RBC AUTO: 88.9 FL (ref 79–97)
MONOCYTES # BLD AUTO: 1.16 10*3/MM3 (ref 0.1–0.9)
MONOCYTES NFR BLD AUTO: 8.8 % (ref 5–12)
NEUTROPHILS NFR BLD AUTO: 70.9 % (ref 42.7–76)
NEUTROPHILS NFR BLD AUTO: 9.4 10*3/MM3 (ref 1.7–7)
NRBC BLD AUTO-RTO: 0 /100 WBC (ref 0–0.2)
PLATELET # BLD AUTO: 413 10*3/MM3 (ref 140–450)
PMV BLD AUTO: 9.6 FL (ref 6–12)
POTASSIUM SERPL-SCNC: 4 MMOL/L (ref 3.5–5.2)
RBC # BLD AUTO: 4.06 10*6/MM3 (ref 3.77–5.28)
SODIUM SERPL-SCNC: 137 MMOL/L (ref 136–145)
WBC NRBC COR # BLD AUTO: 13.24 10*3/MM3 (ref 3.4–10.8)

## 2025-04-08 PROCEDURE — 99024 POSTOP FOLLOW-UP VISIT: CPT | Performed by: STUDENT IN AN ORGANIZED HEALTH CARE EDUCATION/TRAINING PROGRAM

## 2025-04-08 PROCEDURE — 25010000002 HYDROMORPHONE PER 4 MG: Performed by: SURGERY

## 2025-04-08 PROCEDURE — 99024 POSTOP FOLLOW-UP VISIT: CPT | Performed by: SURGERY

## 2025-04-08 PROCEDURE — 80048 BASIC METABOLIC PNL TOTAL CA: CPT | Performed by: SURGERY

## 2025-04-08 PROCEDURE — 25010000002 ENOXAPARIN PER 10 MG: Performed by: SURGERY

## 2025-04-08 PROCEDURE — 25010000002 ONDANSETRON PER 1 MG: Performed by: SURGERY

## 2025-04-08 PROCEDURE — 25810000003 SODIUM CHLORIDE 0.9 % SOLUTION: Performed by: SURGERY

## 2025-04-08 PROCEDURE — 85025 COMPLETE CBC W/AUTO DIFF WBC: CPT | Performed by: SURGERY

## 2025-04-08 PROCEDURE — 25010000002 KETOROLAC TROMETHAMINE PER 15 MG: Performed by: SURGERY

## 2025-04-08 RX ORDER — ACETAMINOPHEN 500 MG
1000 TABLET ORAL 3 TIMES DAILY
Status: COMPLETED | OUTPATIENT
Start: 2025-04-08 | End: 2025-04-10

## 2025-04-08 RX ORDER — OLANZAPINE 2.5 MG/1
5 TABLET, FILM COATED ORAL NIGHTLY
Status: DISCONTINUED | OUTPATIENT
Start: 2025-04-08 | End: 2025-04-12 | Stop reason: HOSPADM

## 2025-04-08 RX ORDER — OLANZAPINE 5 MG/1
5 TABLET ORAL NIGHTLY
COMMUNITY

## 2025-04-08 RX ORDER — LAMOTRIGINE 100 MG/1
200 TABLET ORAL NIGHTLY
Status: DISCONTINUED | OUTPATIENT
Start: 2025-04-08 | End: 2025-04-12 | Stop reason: HOSPADM

## 2025-04-08 RX ORDER — LAMOTRIGINE 100 MG/1
400 TABLET ORAL EVERY MORNING
Status: DISCONTINUED | OUTPATIENT
Start: 2025-04-08 | End: 2025-04-12 | Stop reason: HOSPADM

## 2025-04-08 RX ADMIN — SODIUM CHLORIDE 100 ML/HR: 9 INJECTION, SOLUTION INTRAVENOUS at 04:35

## 2025-04-08 RX ADMIN — ACETAMINOPHEN 1000 MG: 500 TABLET ORAL at 16:02

## 2025-04-08 RX ADMIN — VENLAFAXINE HYDROCHLORIDE 150 MG: 150 CAPSULE, EXTENDED RELEASE ORAL at 22:00

## 2025-04-08 RX ADMIN — KETOROLAC TROMETHAMINE 15 MG: 15 INJECTION, SOLUTION INTRAMUSCULAR; INTRAVENOUS at 12:14

## 2025-04-08 RX ADMIN — ENOXAPARIN SODIUM 40 MG: 100 INJECTION SUBCUTANEOUS at 09:12

## 2025-04-08 RX ADMIN — ACETAMINOPHEN 1000 MG: 500 TABLET, FILM COATED ORAL at 00:31

## 2025-04-08 RX ADMIN — LAMOTRIGINE 400 MG: 100 TABLET ORAL at 09:11

## 2025-04-08 RX ADMIN — HYDROMORPHONE HYDROCHLORIDE 0.5 MG: 1 INJECTION, SOLUTION INTRAMUSCULAR; INTRAVENOUS; SUBCUTANEOUS at 20:01

## 2025-04-08 RX ADMIN — ACETAMINOPHEN 1000 MG: 500 TABLET, FILM COATED ORAL at 06:28

## 2025-04-08 RX ADMIN — KETOROLAC TROMETHAMINE 15 MG: 15 INJECTION, SOLUTION INTRAMUSCULAR; INTRAVENOUS at 00:31

## 2025-04-08 RX ADMIN — HYDROCODONE BITARTRATE AND ACETAMINOPHEN 1 TABLET: 5; 325 TABLET ORAL at 16:45

## 2025-04-08 RX ADMIN — VENLAFAXINE HYDROCHLORIDE 150 MG: 150 CAPSULE, EXTENDED RELEASE ORAL at 09:12

## 2025-04-08 RX ADMIN — HYDROMORPHONE HYDROCHLORIDE 0.5 MG: 1 INJECTION, SOLUTION INTRAMUSCULAR; INTRAVENOUS; SUBCUTANEOUS at 10:41

## 2025-04-08 RX ADMIN — LAMOTRIGINE 200 MG: 100 TABLET ORAL at 21:59

## 2025-04-08 RX ADMIN — OLANZAPINE 5 MG: 2.5 TABLET, FILM COATED ORAL at 21:59

## 2025-04-08 RX ADMIN — HYDROMORPHONE HYDROCHLORIDE 0.5 MG: 1 INJECTION, SOLUTION INTRAMUSCULAR; INTRAVENOUS; SUBCUTANEOUS at 04:30

## 2025-04-08 RX ADMIN — KETOROLAC TROMETHAMINE 15 MG: 15 INJECTION, SOLUTION INTRAMUSCULAR; INTRAVENOUS at 06:28

## 2025-04-08 RX ADMIN — KETOROLAC TROMETHAMINE 15 MG: 15 INJECTION, SOLUTION INTRAMUSCULAR; INTRAVENOUS at 18:13

## 2025-04-08 RX ADMIN — ONDANSETRON 4 MG: 2 INJECTION, SOLUTION INTRAMUSCULAR; INTRAVENOUS at 20:04

## 2025-04-08 RX ADMIN — HYDROCODONE BITARTRATE AND ACETAMINOPHEN 1 TABLET: 5; 325 TABLET ORAL at 09:20

## 2025-04-08 RX ADMIN — HYDROMORPHONE HYDROCHLORIDE 0.5 MG: 1 INJECTION, SOLUTION INTRAMUSCULAR; INTRAVENOUS; SUBCUTANEOUS at 14:18

## 2025-04-08 RX ADMIN — ACETAMINOPHEN 1000 MG: 500 TABLET ORAL at 21:59

## 2025-04-08 RX ADMIN — SODIUM CHLORIDE 100 ML/HR: 9 INJECTION, SOLUTION INTRAVENOUS at 14:18

## 2025-04-08 RX ADMIN — HYDROCODONE BITARTRATE AND ACETAMINOPHEN 1 TABLET: 5; 325 TABLET ORAL at 23:06

## 2025-04-08 NOTE — PROGRESS NOTES
Continued Stay Note  Psychiatric     Patient Name: Deedee Lang  MRN: [id number]  Today's Date: 4/8/2025    Admit Date: 4/7/2025    Plan: Home no needs   Discharge Plan       Row Name 04/08/25 1407       Plan    Plan Home no needs    Plan Comments Introduced self and role of CCP. Patient confirmed DC plan is to return to home. Stated she is independent with ADL's and uses no DMEs. Family will assist as needed and will provide transportation at DC. Denies any needs/equipment.                   Discharge Codes    No documentation.                       Amanda Galeana RN

## 2025-04-08 NOTE — PROGRESS NOTES
[facility]     Progress Note    Patient Name: Deedee Lang  : 1980  MRN: [id number]  Primary Care Physician:  Aniya Garcia APRN  Date of admission: 2025    Subjective   Subjective     Chief Complaint: Chronic pelvic pain     History of Present Illness  Deedee Lang is a 45 y.o. female s/p total laparoscopic hysterectomy, bilateral salpingectomy, laparoscopic sigmoid colon resection for management of chronic pelvic pain and diverticular disease following discovery of contained sigmoid colon perforation against the uterus during surgery.     She reports doing okay this morning. She is tolerating full liquid diet without nausea or vomiting. She has ambulated this morning with assistance and states she felt nauseous towards the end of the walk. Her waite catheter was removed this morning but she has not yet voided. She reports that her pain right now is 6-7/10 and states Norco has worked the best for her. Denies vaginal bleeding. Denies flatus.     Review of Systems   Constitutional:  Negative for chills and fever.   Respiratory:  Negative for shortness of breath.    Cardiovascular:  Negative for chest pain.   Gastrointestinal:  Positive for abdominal pain. Negative for nausea and vomiting.   Genitourinary:  Negative for vaginal bleeding.       Objective   Objective     Vitals:   Temp:  [96.5 °F (35.8 °C)-98.6 °F (37 °C)] 97 °F (36.1 °C)  Heart Rate:  [] 82  Resp:  [14-19] 16  BP: ()/(60-96) 92/60  Flow (L/min) (Oxygen Therapy):  [2-4] 2      Intake/Output Summary (Last 24 hours) at 2025 0943  Last data filed at 2025 0917  Gross per 24 hour   Intake 3548 ml   Output 3100 ml   Net 448 ml       Physical Exam  Vitals reviewed.   Constitutional:       General: She is not in acute distress.  HENT:      Head: Normocephalic and atraumatic.      Right Ear: External ear normal.      Left Ear: External ear normal.   Eyes:      Extraocular Movements: Extraocular movements intact.       Pupils: Pupils are equal, round, and reactive to light.   Pulmonary:      Effort: Pulmonary effort is normal. No respiratory distress.   Abdominal:      General: Bowel sounds are decreased. There is distension (Mild distension).      Palpations: Abdomen is soft.      Tenderness: There is abdominal tenderness in the right lower quadrant, suprapubic area and left lower quadrant. There is no guarding or rebound.       Musculoskeletal:         General: No deformity. Normal range of motion.      Cervical back: Normal range of motion and neck supple.      Comments: SCDs in place and active.    Skin:     General: Skin is warm and dry.   Neurological:      General: No focal deficit present.      Mental Status: She is alert and oriented to person, place, and time.   Psychiatric:         Mood and Affect: Mood normal.         Behavior: Behavior normal.          Result Review    Result Review:  I have personally reviewed the results from the time of this admission to 4/8/2025 09:40 EDT and agree with these findings:  [x]  Laboratory list / accordion  []  Microbiology  []  Radiology  []  EKG/Telemetry   []  Cardiology/Vascular   []  Pathology  []  Old records  []  Other:  Most notable findings include:    4/8/25- CBC - 13.2/12.3/413 (leukocytosis increased from 11.6 preoperatively)   BMP- Cr 0.69, BUN 5, Glucose 101         Assessment & Plan   Assessment / Plan     Brief Patient Summary:  Deedee Lang is a 45 y.o. female s/p total laparoscopic hysterectomy, bilateral salpingectomy, laparoscopic sigmoid colon resection for management of chronic pelvic pain and diverticular disease following discovery of contained sigmoid colon perforation against the uterus during surgery.      Active Hospital Problems:  Active Hospital Problems    Diagnosis     **Pelvic pain     Diverticulitis large intestine      Plan:   - Reviewed AM labs that only demonstrate slight increase in leukocytosis. Have ordered repeat labs for tomorrow AM.    - Advance diet per general surgery team. Patient is currently on full liquid diet.   - CHIOMA drain continues with serosanguinous drainage and removal per general surgery team.   - Morel catheter removed this AM and awaiting voiding trial.   - Awaiting flatus.   - Encouraged ambulation with assistance.   - DVT prophylaxis- ordered for Lovenox and SCDs at rest.   - Continue pain control with scheduled Tylenol, Toradol x 24 hours, Norco PRN, and Dilaudid PRN. We discussed goal is to be on PO medication with time.   - Patient is aware of postoperative goals as described above but understands that she will have to remain postoperatively for 3-5 days for monitoring.   - She is aware of intraoperative findings and we are awaiting pathology results.   - Continue her home medications including Lamictal, Venlafaxine.     VTE Prophylaxis:  Pharmacologic & mechanical VTE prophylaxis orders are present.        CODE STATUS:    Code Status (Patient has no pulse and is not breathing): CPR (Attempt to Resuscitate)  Medical Interventions (Patient has pulse or is breathing): Full Support  Level Of Support Discussed With: Patient   Next of Kin (If No Surrogate)    Disposition:  I expect patient to be discharged 3-5 days.    Coby Cm MD

## 2025-04-08 NOTE — PLAN OF CARE
Goal Outcome Evaluation:              Outcome Evaluation: VSS. Calm and cooperative. Reports feeling emotional. Pain levels remain high but allevited with PRN meds. Voiding without difficulty after catheter removed this AM. CHIOMA dressing changed. CHIOMA dressing clean, dry and intact. Lap incisions open to air with no drainage. Full liquid diet. SCD's on. Ambulated in cantor with assist x 1. Family coming to bedside late afternoon.

## 2025-04-08 NOTE — PROGRESS NOTES
GYN Note    Patient arrived for floor.  Family at bedside.  Reviewed intraoperative findings and need for partial large bowel resection given perforation.  Patient understood and was agreeable that procedure was performed as deemed necessary.

## 2025-04-08 NOTE — PROGRESS NOTES
Postoperative day 1 status post total laparoscopic hysterectomy and bilateral salpingectomy and laparoscopic sigmoid colectomy    S: No events overnight.  No bowel function yet, tolerating clear liquid diet.  Wants to try full liquid diet.  Abdominal pain is well-controlled    O:   Vitals:    04/08/25 0203 04/08/25 0436 04/08/25 0923   BP: 115/79 111/71 92/60   BP Location: Right arm Right arm Right arm   Patient Position: Lying Lying Lying   Pulse: 94 86 82   Resp: 16 16 16   Temp: 98.6 °F (37 °C) 97.9 °F (36.6 °C) 97 °F (36.1 °C)   TempSrc: Oral Oral Oral   SpO2: 94% 98% 97%      Drain 315 cc serosanguineous  Alert, no acute distress  Breathing comfortable  Regular rate rhythm  Abdomen soft, appropriate tender, nontender, incisions clean dry and intact    White blood cell count 13,000 from 11,000, hemoglobin 12.3, stable, over the left stable    Assessment and plan    Postoperative day 1 status post laparoscopic hysterectomy, bilateral salpingectomy and laparoscopic sigmoid colectomy that was done due to contained perforation of the sigmoid colon into the uterus.    She is doing fine.  She has adequate pain control.  She is tolerating clear liquid diet.  Advance to full liquid diet  SCDs and Lovenox  DC Morel catheter  Keep drain in place  Will continue to follow    Ross Cramer MD, FACS  General, Minimally Invasive and Endoscopic Surgery  [facility], [place], operated by [facility]    [address], [address]  [place], [place], [address]  P: [phone]  F: [phone]

## 2025-04-08 NOTE — PAYOR COMM NOTE
"Tierney Crawford (45 y.o. Female)    PLEASE SEE ATTACHED FOR INPT AUTH  REF#[id number]    THANK YOU   CHELSI FARRIS RN/ DEPT   [facility]   PH: [phone]   FAX:  DEPT [phone]      PT CAME IN FOR ELECTIVE LAP HYST, DURING PROCEDURE, Patient was found to have inflammatory phlegmon of the sigmoid colon over the uterus (sigmoid colon was found to be stuck to the superior aspect of the uterus). Dr. Cramer was consulted Intra[facility] for evaluation for laparoscopic sigmoid colectomy.     Date of Birth   1980    Social Security Number       Address   [address]  [place] [address]    Home Phone   [phone]    MRN   [id number]       Baptism   Latter day    Marital Status   Single                            Admission Date   4/7/2025    Admission Type   Elective    Admitting Provider   Percy Mcintyre MD    Attending Provider   Percy Mcintyre MD    Department, Room/Bed   [address], 95/1       Discharge Date       Discharge Disposition       Discharge Destination                                 Attending Provider: Percy Mcintyre MD    Allergies: Codeine, Gluten Meal    Isolation: None   Infection: None   Code Status: CPR    Ht: 175.3 cm (69\")   Wt: 104 kg (229 lb 4.8 oz)    Admission Cmt: None   Principal Problem: Pelvic pain [R10.2]                   Active Insurance as of 4/7/2025       Primary Coverage       Payor Plan Insurance Group Employer/Plan Group    [facility] ERLINDA [id number]       Payor Plan Address Payor Plan Phone Number Payor Plan Fax Number Effective Dates    [address] [address]   1/1/2021 - None Entered    [place] [id number]         Subscriber Name Subscriber Birth Date Member ID       TIERNEY CRAWFORD 1980 [id number]                     Emergency Contacts        (Rel.) Home Phone Work Phone Mobile Phone    MEMO GARSIA (Significant Other) " [phone] -- [phone]    Mayra Lang (Mother) [phone] -- [phone]              Dime Box: [facility] [id number]  Tax ID [id number]     History & Physical        Percy Mcintyre MD at 25 0630          [facility]   HISTORY AND PHYSICAL    Patient Name: Deedee Lang  : 1980  MRN: [id number]  Primary Care Physician:  Aniya Garcia APRN  Date of admission: 2025    Subjective  Subjective     Chief Complaint: pelvic pain    History of Present Illness - Patient is a 45 year old female with ongoing pelvic pain and evidence of hematocolpos on ultrasound.  Despite conservative measures, pain persists and patient would like to proceed with hysterectomy.    Review of Systems   Constitutional:  Negative for chills and fever.   Respiratory:  Negative for shortness of breath.    Cardiovascular:  Negative for chest pain.   Genitourinary:  Positive for pelvic pain and vaginal pain.        Personal History     Past Medical History:   Diagnosis Date    Abnormal Pap smear of cervix     ADHD     Anxiety     Bipolar disorder     COVID-19 2021    Depression     Disease of thyroid gland     Diverticulitis     PMDD (premenstrual dysphoric disorder)     Sjogren's syndrome        Past Surgical History:   Procedure Laterality Date    APPENDECTOMY      BREAST AUGMENTATION       SECTION      D & C HYSTEROSCOPY ENDOMETRIAL ABLATION N/A 2023    Procedure: DILATATION AND CURETTAGE HYSTEROSCOPY NOVASURE ENDOMETRIAL ABLATION;  Surgeon: Percy Mcintyre MD;  Location: [facility];  Service: Obstetrics/Gynecology;  Laterality: N/A;    TUBAL ABDOMINAL LIGATION         Family History: family history includes Alzheimer's disease in her maternal grandmother; Bone cancer in her paternal grandfather; Colon cancer in her paternal grandmother; Depression in her father and mother; Diabetes in her father; Dystonia in her mother; Muscular dystrophy in her maternal grandfather and maternal  grandmother. Otherwise pertinent FHx was reviewed and not pertinent to current issue.    Social History:  reports that she has never smoked. She does not have any smokeless tobacco history on file. She reports that she does not currently use alcohol. She reports that she does not use drugs.    Home Medications:  HYDROcodone-acetaminophen, Magnesium Oxide -Mg Supplement, acetaminophen, amphetamine-dextroamphetamine XR, clonazePAM, dicyclomine, docusate sodium, lamoTRIgine, polyethylene glycol, rizatriptan MLT, sennosides-docusate, traMADol HCl, tretinoin, and venlafaxine XR    Allergies:  Allergies   Allergen Reactions    Codeine Nausea Only    Gluten Meal Other (See Comments)     BLOATING AND DIARRHEA       Objective   Objective     Vitals:        Physical Exam  Vitals and nursing note reviewed.   Constitutional:       Appearance: Normal appearance.   HENT:      Right Ear: External ear normal.      Left Ear: External ear normal.   Cardiovascular:      Rate and Rhythm: Normal rate.   Pulmonary:      Effort: Pulmonary effort is normal.   Abdominal:      Tenderness: There is no guarding or rebound.   Musculoskeletal:      Cervical back: Normal range of motion.   Skin:     General: Skin is warm.   Neurological:      General: No focal deficit present.      Mental Status: She is alert.   Psychiatric:         Mood and Affect: Mood normal.         Behavior: Behavior normal.         Thought Content: Thought content normal.         Judgment: Judgment normal.         Result Review     uCG negative.    WBC 11.57 High  10*3/mm3   RBC 4.19 10*6/mm3   Hemoglobin 12.7 g/dL   Hematocrit 37.2 %   MCV 88.8 fL   MCH 30.3 pg   MCHC 34.1 g/dL   RDW 12.3 %   RDW-SD 40.2 fl   MPV 9.9 fL   Platelets 334 10*3/mm3     Glucose 88 mg/dL   BUN 7 mg/dL   Creatinine 0.74 mg/dL   Sodium 140 mmol/L   Potassium 3.8 mmol/L   Chloride 102 mmol/L   CO2 24.5 mmol/L   Calcium 9.4 mg/dL   BUN/Creatinine Ratio 9.5    Anion Gap 13.5 mmol/L   eGFR 102.5  mL/min/1.73       Assessment & Plan  Assessment / Plan     Brief Patient Summary:  Deedee Lang is a 45 y.o. female with chronic pelvic pain    Active Hospital Problems:  Active Hospital Problems    Diagnosis     **Pelvic pain      Plan:   - Patient for laparoscopic hysterectomy.  I discussed nature of procedure as well as risks including bleeding, infection, injury to internal organs, anesthesia.  Patient voices understanding and agrees to proceed.    VTE Prophylaxis:  Mechanical VTE prophylaxis orders are present.      CODE STATUS:       Admission Status:  I believe this patient meets outpatient status.    Percy Mcintyre MD    Electronically signed by Percy Mcintyre MD at 04/07/25 07       Medication Administration Report for Deedee Lang as of 4/8/25 through 4/8/25     Legend:    Given Hold Not Given Due Canceled Entry Other Actions    Time Time (Time) Time Time-Action         Discontinued     Completed     Future     MAR Hold     Linked             Medications 04/08/25     acetaminophen (TYLENOL) tablet 1,000 mg  Dose: 1,000 mg  Freq: Every 6 Hours Route: PO  Start: 04/07/25 1830 End: 04/08/25 1021     Admin Instructions:   If given for fever, use fever parameter: fever greater than 100.4 °F  Based on patient request - if ordered for moderate or severe pain, provider allows for administration of a medication prescribed for a lower pain scale.    Do not exceed 4 grams of acetaminophen in a 24 hr period. Max dose of 2gm for AST/ALT greater than 120 units/L.    If given for pain, use the following pain scale:   Mild Pain = Pain Score of 1-3, CPOT 1-2  Moderate Pain = Pain Score of 4-6, CPOT 3-4  Severe Pain = Pain Score of 7-10, CPOT 5-8      0031-Given     0628-Given                  enoxaparin sodium (LOVENOX) syringe 40 mg  Dose: 40 mg  Freq: Daily Route: SC  Indications of Use: PROPHYLAXIS OF VENOUS THROMBOEMBOLISM  Start: 04/08/25 0900     Admin Instructions:   Give subcutaneous  in abdomen only. Do not massage site after injection.      0912-Given                   HYDROcodone-acetaminophen (NORCO) 5-325 MG per tablet 1 tablet  Dose: 1 tablet  Freq: Every 6 Hours PRN Route: PO  PRN Reason: Severe Pain  Start: 04/07/25 1403     Admin Instructions:   Based on patient request - if ordered for moderate or severe pain, provider allows for administration of a medication prescribed for a lower pain scale.  [NOELLE]    Do not exceed 4 grams of acetaminophen in a 24 hr period. Max dose of 2gm for AST/ALT greater than 120 units/L        If given for pain, use the following pain scale:   Mild Pain = Pain Score of 1-3, CPOT 1-2  Moderate Pain = Pain Score of 4-6, CPOT 3-4  Severe Pain = Pain Score of 7-10, CPOT 5-8      0920-Given                    HYDROmorphone (DILAUDID) injection 0.5 mg  Dose: 0.5 mg  Freq: Every 2 Hours PRN Route: IV  PRN Reason: Severe Pain  Start: 04/07/25 1741 End: 04/12/25 1740     Admin Instructions:   Based on patient request - if ordered for moderate or severe pain, provider allows for administration of a medication prescribed for a lower pain scale.  If given for pain, use the following pain scale:  Mild Pain = Pain Score of 1-3, CPOT 1-2  Moderate Pain = Pain Score of 4-6, CPOT 3-4  Severe Pain = Pain Score of 7-10, CPOT 5-8      0430-Given     1040                  And   naloxone (NARCAN) injection 0.4 mg  Dose: 0.4 mg  Freq: Every 5 Minutes PRN Route: IV  PRN Reason: Respiratory Depression  Start: 04/07/25 1741     Admin Instructions:   If respiratory rate is less than 8 breaths/minute or patient is difficult to arouse stop any narcotics and contact physician.   Administer slow IV push. Repeat as ordered until patient's respiratory rate is greater than 12 breaths/minute.         ketorolac (TORADOL) injection 15 mg  Dose: 15 mg  Freq: Every 6 Hours Route: IV  Start: 04/07/25 1830 End: 04/09/25 1829     Admin Instructions:   Based on patient request - if ordered for moderate  or severe pain, provider allows for administration of a medication prescribed for a lower pain scale.      If given for pain, use the following pain scale:  Mild Pain = Pain Score of 1-3, CPOT 1-2  Moderate Pain = Pain Score of 4-6, CPOT 3-4  Severe Pain = Pain Score of 7-10, CPOT 5-8      0031-Given     0628-Given     1230     1830                lamoTRIgine (LaMICtal) tablet 400 mg  Dose: 400 mg  Freq: Every Morning Route: PO  Start: 04/08/25 0900     Admin Instructions:   Caution: Look alike/sound alike drug alert      0911-Given                    ondansetron ODT (ZOFRAN-ODT) disintegrating tablet 4 mg  Dose: 4 mg  Freq: Every 6 Hours PRN Route: PO  PRN Reasons: Nausea,Vomiting  Start: 04/07/25 1741     Admin Instructions:   Give Zofran first. Give Phenergan if Zofran not effective. Then if Phenergan not effective, give metoclopramide.  Place on tongue and allow to dissolve.         Or   ondansetron (ZOFRAN) injection 4 mg  Dose: 4 mg  Freq: Every 6 Hours PRN Route: IV  PRN Reasons: Nausea,Vomiting  Start: 04/07/25 1741     Admin Instructions:   Give Zofran first. Give Phenergan if Zofran not effective. Then if Phenergan not effective, give metoclopramide.         sodium chloride 0.9 % infusion  Rate: 100 mL/hr Dose: 100 mL/hr  Freq: Continuous Route: IV  Start: 04/07/25 1830 End: 04/10/25 1825      0434-Stopped     0435-New Bag                  temazepam (RESTORIL) capsule 15 mg  Dose: 15 mg  Freq: Nightly PRN Route: PO  PRN Reason: Sleep  Start: 04/07/25 1748 End: 04/12/25 1747     Admin Instructions:   Group 2 (Pink) Hazardous Drug - Reproductive Risk Only - See Handling Guide         traMADol (ULTRAM) tablet 50 mg  Dose: 50 mg  Freq: Every 6 Hours PRN Route: PO  PRN Reason: Moderate Pain  Start: 04/07/25 1741 End: 04/12/25 1740     Admin Instructions:   Based on patient request - if ordered for moderate or severe pain, provider allows for administration of a medication prescribed for a lower pain  scale.      Caution: Look alike/sound alike drug alert    If given for pain, use the following pain scale:  Mild Pain = Pain Score of 1-3, CPOT 1-2  Moderate Pain = Pain Score of 4-6, CPOT 3-4  Severe Pain = Pain Score of 7-10, CPOT 5-8         venlafaxine XR (EFFEXOR-XR) 24 hr capsule 150 mg  Dose: 150 mg  Freq: 2 Times Daily Route: PO  Start: 04/07/25 2100     Admin Instructions:   Do not crush or chew the capsules or tablets. The drug may not work as designed if the capsule or tablet is crushed or chewed. Swallow whole.      0912-Given     2100                  Future Medications  Medications 04/08/25      acetaminophen (TYLENOL) tablet 1,000 mg  Dose: 1,000 mg  Freq: 3 times daily Route: PO  Start: 04/08/25 1500 End: 04/10/25 1459     Admin Instructions:   If given for fever, use fever parameter: fever greater than 100.4 °F  Based on patient request - if ordered for moderate or severe pain, provider allows for administration of a medication prescribed for a lower pain scale.    Do not exceed 4 grams of acetaminophen in a 24 hr period. Max dose of 2gm for AST/ALT greater than 120 units/L.    If given for pain, use the following pain scale:   Mild Pain = Pain Score of 1-3, CPOT 1-2  Moderate Pain = Pain Score of 4-6, CPOT 3-4  Severe Pain = Pain Score of 7-10, CPOT 5-8      1500     2100                  lamoTRIgine (LaMICtal) tablet 200 mg  Dose: 200 mg  Freq: Nightly Route: PO  Start: 04/08/25 2100     Admin Instructions:   Caution: Look alike/sound alike drug alert      2100                   Completed Medications  Medications 04/08/25      ceFAZolin 2000 mg IVPB in 100 mL NS (MBP)  Dose: 2,000 mg  Freq: Once Route: IV  Indications of Use: PERIOPERATIVE PHARMACOPROPHYLAXIS  Start: 04/07/25 0631 End: 04/07/25 0758     Admin Instructions:   Caution: Look alike/sound alike drug alert         famotidine (PEPCID) injection 20 mg  Dose: 20 mg  Freq: Once Route: IV  Start: 04/07/25 0705 End: 04/07/25 0724     Admin  Instructions:   Give IV push over 2 minutes.         ondansetron (ZOFRAN) injection 4 mg  Dose: 4 mg  Freq: Once As Needed Route: IV  PRN Reasons: Nausea,Vomiting  Indications of Use: POSTOPERATIVE NAUSEA AND VOMITING  Start: 04/07/25 1357 End: 04/07/25 1427     Admin Instructions:   If BOTH ondansetron (ZOFRAN) and promethazine (PHENERGAN) are ordered use ondansetron first and THEN promethazine IF ondansetron is ineffective.         Discontinued Medications  Medications 04/08/25      Atropine Sulfate (PF) injection 0.4 mg  Dose: 0.4 mg  Freq: Once As Needed Route: IV  PRN Reason: Bradycardia  PRN Comment: symptomatic bradycardia (hypotension, dizziness, confusion). Notify attending anesthesiologist.  Start: 04/07/25 1357 End: 04/07/25 1736         bupivacaine-EPINEPHrine PF (MARCAINE w/EPI) 0.25% -[id number] injection  Freq: As Needed  Start: 04/07/25 0810 End: 04/07/25 1411         diphenhydrAMINE (BENADRYL) injection 12.5 mg  Dose: 12.5 mg  Freq: Every 15 Minutes PRN Route: IV  PRN Reason: Itching  PRN Comment: May repeat x 1  Start: 04/07/25 1357 End: 04/07/25 1736     Admin Instructions:   Caution: Look alike/sound alike drug alert. This med may be ordered in other forms and routes. Before giving verify the last time the drug was given by any route/form.          droperidol (INAPSINE) injection 0.625 mg  Dose: 0.625 mg  Freq: Every 20 Minutes PRN Route: IV  PRN Reasons: Nausea,Vomiting  Indications of Use: NAUSEA AND VOMITING  Start: 04/07/25 1357 End: 04/07/25 1736     Admin Instructions:   Use ondansetron first; proceed to droperidol for nausea refractory to ondansetron.         Or   droperidol (INAPSINE) injection 0.625 mg  Dose: 0.625 mg  Freq: Every 20 Minutes PRN Route: IM  PRN Reasons: Nausea,Vomiting  Start: 04/07/25 1357 End: 04/07/25 1736     Admin Instructions:   Use ondansetron first; proceed to droperidol for nausea refractory to ondansetron.         ePHEDrine injection 5 mg  Dose: 5 mg  Freq: Once  As Needed Route: IV  PRN Comment: symptomatic hypotension - Notify attending anesthesiologist if this needs to be given  Start: 04/07/25 1357 End: 04/07/25 1736     Admin Instructions:   Caution: Look alike/sound alike drug alert   Dilute with NS to 5-10 mg/mL.  Central line preferred, if unavailable use large bore IV access with frequent nurse monitoring of IV site.         fentaNYL citrate (PF) (SUBLIMAZE) injection 50 mcg  Dose: 50 mcg  Freq: Every 5 Minutes PRN Route: IV  PRN Reason: Severe Pain  Start: 04/07/25 1357 End: 04/07/25 1736     Admin Instructions:   Maximum total dose of fentanyl is 200 mcg.  If given for pain, use the following pain scale:  Mild Pain = Pain Score of 1-3, CPOT 1-2  Moderate Pain = Pain Score of 4-6, CPOT 3-4  Severe Pain = Pain Score of 7-10, CPOT 5-8         fentaNYL citrate (PF) (SUBLIMAZE) injection 50 mcg  Dose: 50 mcg  Freq: Once As Needed Route: IV  PRN Reason: Severe Pain  Start: 04/07/25 0703 End: 04/07/25 1411     Admin Instructions:   Maximum total dose of fentanyl is 50 mcg without additional orders from physician. May be used for preoperative pain or procedural sedation.  If given for pain, use the following pain scale:  Mild Pain = Pain Score of 1-3, CPOT 1-2  Moderate Pain = Pain Score of 4-6, CPOT 3-4  Severe Pain = Pain Score of 7-10, CPOT 5-8         flumazenil (ROMAZICON) injection 0.2 mg  Dose: 0.2 mg  Freq: As Needed Route: IV  PRN Comment: for benzodiazepine induced unresponsiveness or sedation  Start: 04/07/25 1357 End: 04/07/25 1736     Admin Instructions:   Notify Anesthesia if given  ** give IV over 15-30 seconds **         hydrALAZINE (APRESOLINE) injection 5 mg  Dose: 5 mg  Freq: Every 10 Minutes PRN Route: IV  PRN Reason: High Blood Pressure  PRN Comment: for systolic blood pressure greater than 180 mmHg or diastolic blood pressure greater than 105 mmHg  Start: 04/07/25 1357 End: 04/07/25 1736     Admin Instructions:   Up to 20 mg. If labetalol and  hydralazine are both ordered, use labetalol first.  Caution: Look alike/sound alike drug alert         HYDROcodone-acetaminophen (NORCO) 5-325 MG per tablet 1 tablet  Dose: 1 tablet  Freq: Once As Needed Route: PO  PRN Reason: Moderate Pain  Start: 04/07/25 1357 End: 04/07/25 1736     Admin Instructions:   Based on patient request - if ordered for moderate or severe pain, provider allows for administration of a medication prescribed for a lower pain scale.  [NOELLE]    Do not exceed 4 grams of acetaminophen in a 24 hr period. Max dose of 2gm for AST/ALT greater than 120 units/L        If given for pain, use the following pain scale:   Mild Pain = Pain Score of 1-3, CPOT 1-2  Moderate Pain = Pain Score of 4-6, CPOT 3-4  Severe Pain = Pain Score of 7-10, CPOT 5-8         HYDROmorphone (DILAUDID) injection 0.5 mg  Dose: 0.5 mg  Freq: Every 5 Minutes PRN Route: IV  PRN Reason: Moderate Pain  Start: 04/07/25 1357 End: 04/07/25 1736     Admin Instructions:   Maximum total dose of hydromorphone is 2 mg.  If given for pain, use the following pain scale:  Mild Pain = Pain Score of 1-3, CPOT 1-2  Moderate Pain = Pain Score of 4-6, CPOT 3-4  Severe Pain = Pain Score of 7-10, CPOT 5-8         ipratropium-albuterol (DUO-NEB) nebulizer solution 3 mL  Dose: 3 mL  Freq: Once As Needed Route: NEBULIZATION  PRN Reasons: Wheezing,Shortness of Air  PRN Comment: bronchospasm  Start: 04/07/25 1357 End: 04/07/25 1736     Admin Instructions:   Notify Anesthesia if minineb is given         labetalol (NORMODYNE,TRANDATE) injection 5 mg  Dose: 5 mg  Freq: Every 5 Minutes PRN Route: IV  PRN Reason: High Blood Pressure  PRN Comment: for systolic blood pressure greater than 180 mmHg or diastolic blood pressure greater than 105 mmHg  Start: 04/07/25 1357 End: 04/07/25 1736     Admin Instructions:   Hold for heart rate less than 60.    If labetalol and hydralazine are both ordered, use labetalol first. Maximum dose of labetalol is 20mg IV while in  PACU, notify anesthesiologist for further orders if needed.  Give IV Push over 2 minutes.         lactated ringers infusion  Rate: 9 mL/hr Dose: 9 mL/hr  Freq: Continuous Route: IV  Start: 04/07/25 0705 End: 04/08/25 0723         lamoTRIgine (LaMICtal) tablet 200 mg  Dose: 200 mg  Freq: Every Morning Route: PO  Start: 04/08/25 0700 End: 04/08/25 0802     Admin Instructions:   Caution: Look alike/sound alike drug alert      (0818)-Not Given                   lidocaine (XYLOCAINE) 1 % injection 0.5 mL  Dose: 0.5 mL  Freq: Once As Needed Route: ID  PRN Comment: IV Start  Start: 04/07/25 0703 End: 04/07/25 1411         midazolam (VERSED) injection 1 mg  Dose: 1 mg  Freq: Every 5 Minutes PRN Route: IV  PRN Comment: Anxiety prophylaxis, Pre-op comfort  Start: 04/07/25 0703 End: 04/07/25 1411     Admin Instructions:   May repeat dose in 5 minutes one time then contact provider for additional orders.           naloxone (NARCAN) injection 0.2 mg  Dose: 0.2 mg  Freq: As Needed Route: IV  PRN Reasons: Opioid Reversal,Respiratory Depression  PRN Comment: unresponsiveness, decrease oxygen saturation  Indications of Use: ACUTE RESPIRATORY FAILURE,OPIOID-INDUCED RESPIRATORY DEPRESSION  Start: 04/07/25 1357 End: 04/07/25 1736     Admin Instructions:   Notify Anesthesia if given         oxyCODONE-acetaminophen (PERCOCET) 7.5-325 MG per tablet 1 tablet  Dose: 1 tablet  Freq: Every 4 Hours PRN Route: PO  PRN Reason: Severe Pain  Start: 04/07/25 1357 End: 04/07/25 1736     Admin Instructions:   [NOELLE]    Do not exceed 4 grams of acetaminophen in a 24 hr period. Max dose of 2gm for AST/ALT greater than 120 units/L        If given for pain, use the following pain scale:   Mild Pain = Pain Score of 1-3, CPOT 1-2  Moderate Pain = Pain Score of 4-6, CPOT 3-4  Severe Pain = Pain Score of 7-10, CPOT 5-8          promethazine (PHENERGAN) suppository 25 mg  Dose: 25 mg  Freq: Once As Needed Route: RE  PRN Reasons: Nausea,Vomiting  Start:  04/07/25 1357 End: 04/07/25 1736     Admin Instructions:   If BOTH ondansetron (ZOFRAN) and promethazine (PHENERGAN) are ordered use ondansetron first and THEN promethazine IF ondansetron is ineffective.         Or   promethazine (PHENERGAN) tablet 25 mg  Dose: 25 mg  Freq: Once As Needed Route: PO  PRN Reasons: Nausea,Vomiting  Start: 04/07/25 1357 End: 04/07/25 1736     Admin Instructions:   If BOTH ondansetron (ZOFRAN) and promethazine (PHENERGAN) are ordered use ondansetron first and THEN promethazine IF ondansetron is ineffective.           sodium chloride (NS) irrigation solution  Freq: As Needed  Start: 04/07/25 1310 End: 04/07/25 1411         sodium chloride 0.9 % flush 3 mL  Dose: 3 mL  Freq: Every 12 Hours Scheduled Route: IV  Start: 04/07/25 0900 End: 04/07/25 1411         sodium chloride 0.9 % flush 3-10 mL  Dose: 3-10 mL  Freq: As Needed Route: IV  PRN Reason: Line Care  Start: 04/07/25 0703 End: 04/07/25 1411                        Operative/Procedure Notes (all)        Percy Mcintyre MD at 04/07/25 0801  Version 1 of 1         Operative Note    Date of Surgery:  4/7/25    Pre-operative Diagnosis:  Chronic Pelvic Pain    Post-operative Diagnosis:  Chronic Pelvic Pain and Diverticular Disease    Procedure:  Total Laparoscopic Hysterectomy with Bilateral Salpingectomy.    Primary Surgeon:  Percy Mcintyre MD    Assistant Surgeon:  Andres Peñaloza MD    Consulting Surgeon:  Ross Cramer MD    Anesthesia:  General    Findings:  Enlarged globular uterus with grossly normal ovaries.  Patient with omental adhesions to anterior abdominal wall and adhesions of large bowel to left pelvic side wall and uterus, with likely previous inflammation/perforation.   Dr Cramer with general surgery was consulted to evaluate and treat large bowel adhesions.    Description of Procedure:   Patient was taken to operating room. After adequate general anesthesia was administered, the patient was placed on OR  table in dorsal  lithotomy position in [facility]. Patient identified with two identifiers and timeout performed with all team members agreeing to the planned procedure.  Abdomen, vagina, perineum, and rectum  was prepped and draped in a normal, sterile fashion.     A bivalve speculum was placed in the vagina.  The cervix was visualized and anterior lip grasped with single tooth tenaculum.  The uterus was sounded to 9 centimeters with the Teague sound.  The  uterine manipulator was then placed.  A Morel catheter was inserted.  All other instruments were removed from the vagina and attention was turned towards the abdominal portion.    A 1 centimeter infraumbilical incision was made with the knife.  The fascia was grasped with Kocher clamps x2 and elevated and incised with curved Mayos.  The peritoneal cavity was entered bluntly.  A 10 mm Iraida trocar was then placed.  2 stay sutures of 0 Vicryl were placed at either side in the fascia.  The patient was then placed in Trendelenburg position.  A 10 millimeter 0 degree scope was passed through the port.  Inspection of the pelvic and abdominal cavity revealed the findings as noted above.  Accessory port sites were placed one in the left lower quadrant and one in the right lower quadrant.  Each was a 5 millimeter disposable port.  These were placed under direct laparoscopic visualization without injury noted.      Patient had significant omental adhesions involving the anterior abdominal wall.  Use a combination of blunt dissection and use of Ligasure, the adhesions were removed without difficulty.  Further inspection revealed significant adhesions involving the sigmoid colon to the pelvic sidewall and cornua of the left part of the uterus.  We consulted Dr Cramer with General Surgery.  His notes can be obtained to review the details of his intraoperative consult, which resulted in colon resection.  Both Dr Cramer and ZHANNA scrubbed out of surgery to discuss  intraoperative findings with patient's significant other and her father.  Discussed need for partial resection of colon due to perforation and they agreed that this would be patient's wish for the most optimal surgical outcome.  Discussed that failure to resect perforated colon could result in significant infection and morbidity.  Family agreed that surgical endeavor should be undertaken for the patient and her consents were signed.      However, prior to resection, it was decided to proceed with hysterectomy first.  The ureter was identified on the left side and appeared to be well away from the line of transection.  The left tube was mobilized and removed from its attachment to the Broad Ligament using  the LigaSure Advance device.  The left round ligament was then coagulated and transected with the LigaSure.  The broad ligament was dissected into the anterior and posterior leaflets.  The anterior leaflet was further dissected with the LigaSure and the bladder flap was dissected off inferiorly.  Notably, the bladder was adherent to the lower uterine segment due to previous .  Then in a similar fashion the posterior leaflet was dissected and the uterine artery on the left side was skeletonized.  The uterine artery was then taken with the ligasure device.  This process was repeated on the right side in a similar fashion.  Attention was then turned back towards the bladder peritoneum which was further dissected off the cervical vaginal fascia.  Anterior colpotomy was then made with the monopolar tip of the LigaSure device.  Entry into the vagina was confirmed by visualization of the uterine manipulator cup.  Then followed the colpotomy around laterally until the specimen was completely excised.  Specimen was passed through the vagina.  The pelvic cavity was irrigated.  The vaginal cuff was then reapproximated with the V-lock barbed delayed absorbable suture.   After completion of the cuff closure, the pelvic  cavity was again irrigated.  Good hemostasis was noted.   The remainder of the surgical endeavor can be reviewed thru Dr Cramer's notes.    EBL:  25 cc    Specimens:  Uterus and bilateral uterine tubes.    Complications:  Adhesions involving sigmoid and omentum.  Likely previous perforation of large bowel with adhesion to uterus.    Disposition:  To PACU    Percy Mcintyre MD     Electronically signed by Percy Mcintyre MD at 04/07/25 1304       Ross Cramer MD at 04/07/25 0801  Version 1 of 1         Date of procedure: 4/7/2025    Primary Surgeon: Dr. Cramer    Assistant: Samara Garcia PA-C that was in charge of holding camera, retraction, closing wounds and placing dressings that was essential for success of the case    Preoperative diagnosis: Pelvic pain    Postoperative diagnosis: Sigmoid colon diverticulitis with contained perforation    Procedure performed: Laparoscopic sigmoid colectomy    Anesthesia: General    Complications: None    Findings: Contained sigmoid colon perforation against the uterus    Condition of patient: Stable to recovery    Specimen: Sigmoid colon    Indications: 45-year-old female that was taken to operative room by Dr. Mcintyre for chronic pelvic pain.  Patient was found to have inflammatory phlegmon of the sigmoid colon over the uterus.  I was consulted IntraOp for evaluation.  We discussed about treatment options that would include aborting the procedure and keeping the patient on antibiotics in hopes of resolution of diverticulitis versus proceeding with hysterectomy.  Because of chronic pain Dr. Mcintyre felt that the patient will benefit from hysterectomy.  I scrubbed in and when dissecting the sigmoid colon from the uterus a contained perforation was found.  We discussed with the family then about the issue and the need for the patient to have sigmoid colon resection.  Risk and benefits of procedure including bleeding, infection, wound complications,  anastomotic leak, ureteral injury and the need for further operation discussed with the patient significant other as well as her father that verbalized understanding and agreed with the plan    Description:   After hysterectomy was completed I explored abdominal cavity.  There was evidence of bleeding from the vaginal cuff.  We assessed the rectal stump with serial dilators to ensure that there was no large amount of stool that would prevent safely performing an anastomosis.  Serial dilators were introduced and advanced all the way up to the sigmoid colon that there was no significant amount of stool. This was controlled with harmonic ultrasonic junaid.  There was evidence of a perforated segment of the proximal sigmoid colon.  I then added another 5 mm trocar in the right mid abdomen and a right lower quadrant 5 mm trocar previously placed by gynecology service was replaced by 12 mm trocar.  We started the procedure by taking the peritoneal attachment over the right side of the mesentery at the level of the sacral promontory.  Dissection medial to lateral was performed and the ureter was recognized.  I then continue dissection of the sigmoid colon against the attachments to the lateral abdominal wall.  This was done with harmonic ultrasonic junaid.  The ureter was recognized and preserved.  Dissection continue superiorly by taking the attachments from the sigmoid colon and the descending colon to the Toltz fascia.  The colon was mobilized lateral to medial all the way up  to the splenic flexure that was now completely mobilized.  I then proceeded to transect the peritoneal attachments of the superior rectum at the pelvis with harmonic ultrasonic junaid.  Upper rectum was skeletonized and then transected with [place] blue load stapler.  Mesentery of the sigmoid colon was then transected superiorly and inferior mesenteric artery was skeletonized and resected with [place] busby load stapler.  I then selected a point at  the distal descending colon for transection so mesentery transection was taken all the way up to the point.  Then a right lower quadrant incision was performed with scalpel and dissection was carried into the subcutaneous tissue and anterior rectus sheath fascia was transected.  Rectus muscle was split.  Posterior rectus sheath was entered.  Wound retractor was placed.  The specimen was brought out through the right lower quadrant incision and the distal descending colon was transected.  Blood flow of the descending colon was assessed with Doppler and this was found to be adequate.  I then performed pursestring of the distal descending colon and 28 mm circular anvil for stapler was introduced and secured in place.  I then added  atrial millimeter trocar in the supra umbilical area to allow better visualization of the pelvis.  An  end-to-end colorectal anastomosis was performed with 28 mm circular stapler.  There were 2 healthy donuts and the proximal and distal end.  Insufflation of the anastomosis did not show any evidence of leak under water.  Then abdominal cavity was then reinsufflated and there was an area of bleeding at the omentum of the transverse colon that had been taken down priorly.  This was transected with ultrasonic junaid.  There was no more evidence of bleeding.  I then decided to place an 18 [place] Abelardo drain through the right lower quadrant trocar that was removed through the right upper quadrant trocar and secured in place with 3-0 nylon.  Then the right lower quadrant 12 mm trocar was removed and the defect was closed with 0 Vicryl Endo Close technique.  The supraumbilical 12 mm trocar was removed and the defect was closed with 0 Vicryl Endo Close technique.  The [place] trocar that had been placed originally by Dr. Mcintyre was removed and the fascial sutures were tied.  Pneumoperitoneum was completely desufflated after all the trocars were removed.  I then proceeded to close the right lower  quadrant incision with running 2-0 Vicryl for the posterior rectus sheath as well as running #1 PDS for anterior rectus sheath.  All incisions were closed with 4-0 Monocryl and surgical glue.  The patient tolerated the procedure well and she was sent to recovery in stable condition.    Ross Cramer MD, FACS  General, Minimally Invasive and Endoscopic Surgery  [facility]    [address], [address]  [place], [place], [address]  P: [phone]  F: [phone]                Electronically signed by Ross Cramer MD at 25 1402          Physician Progress Notes (all)        Coby Cm MD at 25 0966           [facility]     Progress Note    Patient Name: Deedee Lang  : 1980  MRN: [id number]  Primary Care Physician:  Anyia Garcia APRN  Date of admission: 2025    Subjective   Subjective     Chief Complaint: Chronic pelvic pain     History of Present Illness  Deedee Lang is a 45 y.o. female s/p total laparoscopic hysterectomy, bilateral salpingectomy, laparoscopic sigmoid colon resection for management of chronic pelvic pain and diverticular disease following discovery of contained sigmoid colon perforation against the uterus during surgery.     She reports doing okay this morning. She is tolerating full liquid diet without nausea or vomiting. She has ambulated this morning with assistance and states she felt nauseous towards the end of the walk. Her waite catheter was removed this morning but she has not yet voided. She reports that her pain right now is 6-7/10 and states Norco has worked the best for her. Denies vaginal bleeding. Denies flatus.     Review of Systems   Constitutional:  Negative for chills and fever.   Respiratory:  Negative for shortness of breath.    Cardiovascular:  Negative for chest pain.   Gastrointestinal:  Positive for abdominal pain. Negative for nausea and vomiting.   Genitourinary:  Negative for vaginal bleeding.        Objective   Objective     Vitals:   Temp:  [96.5 °F (35.8 °C)-98.6 °F (37 °C)] 97 °F (36.1 °C)  Heart Rate:  [] 82  Resp:  [14-19] 16  BP: ()/(60-96) 92/60  Flow (L/min) (Oxygen Therapy):  [2-4] 2      Intake/Output Summary (Last 24 hours) at 4/8/2025 0943  Last data filed at 4/8/2025 0917  Gross per 24 hour   Intake 3548 ml   Output 3100 ml   Net 448 ml       Physical Exam  Vitals reviewed.   Constitutional:       General: She is not in acute distress.  HENT:      Head: Normocephalic and atraumatic.      Right Ear: External ear normal.      Left Ear: External ear normal.   Eyes:      Extraocular Movements: Extraocular movements intact.      Pupils: Pupils are equal, round, and reactive to light.   Pulmonary:      Effort: Pulmonary effort is normal. No respiratory distress.   Abdominal:      General: Bowel sounds are decreased. There is distension (Mild distension).      Palpations: Abdomen is soft.      Tenderness: There is abdominal tenderness in the right lower quadrant, suprapubic area and left lower quadrant. There is no guarding or rebound.       Musculoskeletal:         General: No deformity. Normal range of motion.      Cervical back: Normal range of motion and neck supple.      Comments: SCDs in place and active.    Skin:     General: Skin is warm and dry.   Neurological:      General: No focal deficit present.      Mental Status: She is alert and oriented to person, place, and time.   Psychiatric:         Mood and Affect: Mood normal.         Behavior: Behavior normal.          Result Review    Result Review:  I have personally reviewed the results from the time of this admission to 4/8/2025 09:40 EDT and agree with these findings:  [x]  Laboratory list / accordion  []  Microbiology  []  Radiology  []  EKG/Telemetry   []  Cardiology/Vascular   []  Pathology  []  Old records  []  Other:  Most notable findings include:    4/8/25- CBC - 13.2/12.3/413 (leukocytosis increased from 11.6  preoperatively)   BMP- Cr 0.69, BUN 5, Glucose 101         Assessment & Plan   Assessment / Plan     Brief Patient Summary:  Deedee Lang is a 45 y.o. female s/p total laparoscopic hysterectomy, bilateral salpingectomy, laparoscopic sigmoid colon resection for management of chronic pelvic pain and diverticular disease following discovery of contained sigmoid colon perforation against the uterus during surgery.      Active Hospital Problems:  Active Hospital Problems    Diagnosis     **Pelvic pain     Diverticulitis large intestine      Plan:   - Reviewed AM labs that only demonstrate slight increase in leukocytosis. Have ordered repeat labs for tomorrow AM.   - Advance diet per general surgery team. Patient is currently on full liquid diet.   - CHIOMA drain continues with serosanguinous drainage and removal per general surgery team.   - Morel catheter removed this AM and awaiting voiding trial.   - Awaiting flatus.   - Encouraged ambulation with assistance.   - DVT prophylaxis- ordered for Lovenox and SCDs at rest.   - Continue pain control with scheduled Tylenol, Toradol x 24 hours, Norco PRN, and Dilaudid PRN. We discussed goal is to be on PO medication with time.   - Patient is aware of postoperative goals as described above but understands that she will have to remain postoperatively for 3-5 days for monitoring.   - She is aware of intraoperative findings and we are awaiting pathology results.   - Continue her home medications including Lamictal, Venlafaxine.     VTE Prophylaxis:  Pharmacologic & mechanical VTE prophylaxis orders are present.        CODE STATUS:    Code Status (Patient has no pulse and is not breathing): CPR (Attempt to Resuscitate)  Medical Interventions (Patient has pulse or is breathing): Full Support  Level Of Support Discussed With: Patient   Next of Kin (If No Surrogate)    Disposition:  I expect patient to be discharged 3-5 days.    Coby Cm MD               Electronically signed  by Coby Cm MD at 04/08/25 1027       Percy Mcintyre MD at 04/07/25 2341          GYN Note    Patient arrived for floor.  Family at bedside.  Reviewed intraoperative findings and need for partial large bowel resection given perforation.  Patient understood and was agreeable that procedure was performed as deemed necessary.    Electronically signed by Percy Mcintyre MD at 04/07/25 2343          Consult Notes (all)        Ross Cramer MD at 04/07/25 0971          Cc: Sigmoid colon diverticulitis, IntraOp consult    HPI: Patient is a 45-year-old female that is here in the operative room for hysterectomy due to chronic pelvic pain.   I was consulted intra op because the sigmoid colon was inflamed and attached to the uterus.  As per significant other patient has been having pelvic pain now for several months.  She underwent CT scan of the abdomen and pelvis that was done in February that show possible sigmoid colitis diverticulitis.  Patient was treated with antibiotics.  She continued to have pain.  She was referred to Dr. Mcintyre for evaluation.  She was taken to the operative room for laparoscopic hysterectomy.    Intraoperatively, the  sigmoid colon was found to be stuck to the superior aspect of the uterus.  I was called intraoperatively to help with dissection.  We discussed with Dr. Mcintyre that she likely had diverticulitis and may have had contained perforation to the uterus.  He felt patient needed hysterectomy so I proceeded with dissection of the attachments of the colon to the uterus.    I scrubbed in initially and dissection of the sigmoid colon from the uterus was performed.  There was clear evidence of perforation of the sigmoid colon after the colon was dissected.    At this time I and scrub and spoke with the family about the issue.  Discussed with them that patient likely had diverticulitis with contained perforation that had created inflammatory reaction against  the uterus.  She now has a small perforation likely perforated diverticuli that had been contained.  I recommend that we  proceed with a laparoscopic sigmoid colectomy with possible ostomy.  Patient significant other and father understand about the increased risk of anastomotic leak  none prep colon.  Risk and benefits of procedure including bleeding, infection, ureteral injury, anastomotic leak, abscess formation, the need for further operation discussed in detail with both that signed consent for the patient, verbalized understanding and agreed with the plan    We will proceed with laparoscopic sigmoid colectomy after hysterectomy is completed.    Ross Cramer MD, FACS  General, Minimally Invasive and Endoscopic Surgery  [facility]    [address], [address]  [place], [place], [address]  P: [phone]  F: [phone]      Electronically signed by Ross Cramer MD at 04/07/25 0967

## 2025-04-08 NOTE — PLAN OF CARE
Problem: Adult Inpatient Plan of Care  Goal: Plan of Care Review  Outcome: Progressing  Flowsheets (Taken 4/8/2025 0512)  Progress: improving  Outcome Evaluation: coping well, adequate pain control, medicated with Norco x1 and IV Dilaudid x2, on scheduled Tylenol and Toradol, ambulated 200 feet with assist x2, got nauseated IV Zofran given with relief, lap sites, incision CDI open to air, modearte amount of serous sanguinous CHIOMA out put, waite with good urine output, no vaginal bleeding noted, advanced to Full liquid diet for breakfast, IS up to 1000, VSS  Plan of Care Reviewed With: patient   Goal Outcome Evaluation:  Plan of Care Reviewed With: patient        Progress: improving  Outcome Evaluation: coping well, adequate pain control, medicated with Norco x1 and IV Dilaudid x2, on scheduled Tylenol and Toradol, ambulated 200 feet with assist x2, got nauseated IV Zofran given with relief, lap sites, incision CDI open to air, modearte amount of serous sanguinous CHIOMA out put, waite with good urine output, no vaginal bleeding noted, advanced to Full liquid diet for breakfast, IS up to 1000, VSS

## 2025-04-09 LAB
ANION GAP SERPL CALCULATED.3IONS-SCNC: 7.4 MMOL/L (ref 5–15)
BASOPHILS # BLD AUTO: 0.01 10*3/MM3 (ref 0–0.2)
BASOPHILS NFR BLD AUTO: 0.1 % (ref 0–1.5)
BUN SERPL-MCNC: 4 MG/DL (ref 6–20)
BUN/CREAT SERPL: 5.7 (ref 7–25)
CALCIUM SPEC-SCNC: 8.8 MG/DL (ref 8.6–10.5)
CHLORIDE SERPL-SCNC: 108 MMOL/L (ref 98–107)
CO2 SERPL-SCNC: 27.6 MMOL/L (ref 22–29)
CREAT SERPL-MCNC: 0.7 MG/DL (ref 0.57–1)
CYTO UR: NORMAL
CYTO UR: NORMAL
DEPRECATED RDW RBC AUTO: 38.7 FL (ref 37–54)
EGFRCR SERPLBLD CKD-EPI 2021: 109.5 ML/MIN/1.73
EOSINOPHIL # BLD AUTO: 0 10*3/MM3 (ref 0–0.4)
EOSINOPHIL NFR BLD AUTO: 0 % (ref 0.3–6.2)
ERYTHROCYTE [DISTWIDTH] IN BLOOD BY AUTOMATED COUNT: 11.8 % (ref 12.3–15.4)
GLUCOSE SERPL-MCNC: 94 MG/DL (ref 65–99)
HCT VFR BLD AUTO: 32.4 % (ref 34–46.6)
HGB BLD-MCNC: 11 G/DL (ref 12–15.9)
IMM GRANULOCYTES # BLD AUTO: 0.02 10*3/MM3 (ref 0–0.05)
IMM GRANULOCYTES NFR BLD AUTO: 0.2 % (ref 0–0.5)
LAB AP CASE REPORT: NORMAL
LAB AP CASE REPORT: NORMAL
LAB AP DIAGNOSIS COMMENT: NORMAL
LAB AP DIAGNOSIS COMMENT: NORMAL
LAB AP SPECIAL STAINS: NORMAL
LAB AP SPECIAL STAINS: NORMAL
LYMPHOCYTES # BLD AUTO: 3.25 10*3/MM3 (ref 0.7–3.1)
LYMPHOCYTES NFR BLD AUTO: 37.5 % (ref 19.6–45.3)
MCH RBC QN AUTO: 30.4 PG (ref 26.6–33)
MCHC RBC AUTO-ENTMCNC: 34 G/DL (ref 31.5–35.7)
MCV RBC AUTO: 89.5 FL (ref 79–97)
MONOCYTES # BLD AUTO: 0.75 10*3/MM3 (ref 0.1–0.9)
MONOCYTES NFR BLD AUTO: 8.7 % (ref 5–12)
NEUTROPHILS NFR BLD AUTO: 4.63 10*3/MM3 (ref 1.7–7)
NEUTROPHILS NFR BLD AUTO: 53.5 % (ref 42.7–76)
NRBC BLD AUTO-RTO: 0 /100 WBC (ref 0–0.2)
PATH REPORT.FINAL DX SPEC: NORMAL
PATH REPORT.FINAL DX SPEC: NORMAL
PATH REPORT.GROSS SPEC: NORMAL
PATH REPORT.GROSS SPEC: NORMAL
PLATELET # BLD AUTO: 335 10*3/MM3 (ref 140–450)
PMV BLD AUTO: 9.1 FL (ref 6–12)
POTASSIUM SERPL-SCNC: 3.9 MMOL/L (ref 3.5–5.2)
RBC # BLD AUTO: 3.62 10*6/MM3 (ref 3.77–5.28)
SODIUM SERPL-SCNC: 143 MMOL/L (ref 136–145)
WBC NRBC COR # BLD AUTO: 8.66 10*3/MM3 (ref 3.4–10.8)

## 2025-04-09 PROCEDURE — 25010000002 ENOXAPARIN PER 10 MG: Performed by: SURGERY

## 2025-04-09 PROCEDURE — 85025 COMPLETE CBC W/AUTO DIFF WBC: CPT | Performed by: STUDENT IN AN ORGANIZED HEALTH CARE EDUCATION/TRAINING PROGRAM

## 2025-04-09 PROCEDURE — 99024 POSTOP FOLLOW-UP VISIT: CPT | Performed by: OBSTETRICS & GYNECOLOGY

## 2025-04-09 PROCEDURE — 25810000003 SODIUM CHLORIDE 0.9 % SOLUTION: Performed by: SURGERY

## 2025-04-09 PROCEDURE — 99024 POSTOP FOLLOW-UP VISIT: CPT | Performed by: SURGERY

## 2025-04-09 PROCEDURE — 25010000002 HYDROMORPHONE PER 4 MG: Performed by: SURGERY

## 2025-04-09 PROCEDURE — 25010000002 KETOROLAC TROMETHAMINE PER 15 MG: Performed by: SURGERY

## 2025-04-09 PROCEDURE — 80048 BASIC METABOLIC PNL TOTAL CA: CPT | Performed by: STUDENT IN AN ORGANIZED HEALTH CARE EDUCATION/TRAINING PROGRAM

## 2025-04-09 RX ORDER — CLONAZEPAM 1 MG/1
1 TABLET ORAL 2 TIMES DAILY PRN
Status: DISCONTINUED | OUTPATIENT
Start: 2025-04-09 | End: 2025-04-12 | Stop reason: HOSPADM

## 2025-04-09 RX ORDER — OXYCODONE AND ACETAMINOPHEN 5; 325 MG/1; MG/1
1 TABLET ORAL EVERY 6 HOURS PRN
Refills: 0 | Status: DISCONTINUED | OUTPATIENT
Start: 2025-04-09 | End: 2025-04-10

## 2025-04-09 RX ADMIN — OXYCODONE AND ACETAMINOPHEN 1 TABLET: 5; 325 TABLET ORAL at 14:27

## 2025-04-09 RX ADMIN — KETOROLAC TROMETHAMINE 15 MG: 15 INJECTION, SOLUTION INTRAMUSCULAR; INTRAVENOUS at 06:34

## 2025-04-09 RX ADMIN — KETOROLAC TROMETHAMINE 15 MG: 15 INJECTION, SOLUTION INTRAMUSCULAR; INTRAVENOUS at 12:02

## 2025-04-09 RX ADMIN — ACETAMINOPHEN 1000 MG: 500 TABLET ORAL at 08:00

## 2025-04-09 RX ADMIN — OXYCODONE AND ACETAMINOPHEN 1 TABLET: 5; 325 TABLET ORAL at 20:27

## 2025-04-09 RX ADMIN — HYDROMORPHONE HYDROCHLORIDE 0.5 MG: 1 INJECTION, SOLUTION INTRAMUSCULAR; INTRAVENOUS; SUBCUTANEOUS at 04:20

## 2025-04-09 RX ADMIN — SODIUM CHLORIDE 100 ML/HR: 9 INJECTION, SOLUTION INTRAVENOUS at 00:23

## 2025-04-09 RX ADMIN — VENLAFAXINE HYDROCHLORIDE 150 MG: 150 CAPSULE, EXTENDED RELEASE ORAL at 20:26

## 2025-04-09 RX ADMIN — OLANZAPINE 5 MG: 2.5 TABLET, FILM COATED ORAL at 20:26

## 2025-04-09 RX ADMIN — CLONAZEPAM 1 MG: 1 TABLET ORAL at 21:37

## 2025-04-09 RX ADMIN — HYDROMORPHONE HYDROCHLORIDE 0.5 MG: 1 INJECTION, SOLUTION INTRAMUSCULAR; INTRAVENOUS; SUBCUTANEOUS at 07:52

## 2025-04-09 RX ADMIN — VENLAFAXINE HYDROCHLORIDE 150 MG: 150 CAPSULE, EXTENDED RELEASE ORAL at 08:01

## 2025-04-09 RX ADMIN — HYDROMORPHONE HYDROCHLORIDE 0.5 MG: 1 INJECTION, SOLUTION INTRAMUSCULAR; INTRAVENOUS; SUBCUTANEOUS at 10:58

## 2025-04-09 RX ADMIN — ENOXAPARIN SODIUM 40 MG: 100 INJECTION SUBCUTANEOUS at 08:01

## 2025-04-09 RX ADMIN — ACETAMINOPHEN 500 MG: 500 TABLET ORAL at 20:26

## 2025-04-09 RX ADMIN — ACETAMINOPHEN 1000 MG: 500 TABLET ORAL at 14:27

## 2025-04-09 RX ADMIN — LAMOTRIGINE 200 MG: 100 TABLET ORAL at 20:26

## 2025-04-09 RX ADMIN — LAMOTRIGINE 400 MG: 100 TABLET ORAL at 06:34

## 2025-04-09 RX ADMIN — SODIUM CHLORIDE 100 ML/HR: 9 INJECTION, SOLUTION INTRAVENOUS at 10:42

## 2025-04-09 RX ADMIN — HYDROMORPHONE HYDROCHLORIDE 0.5 MG: 1 INJECTION, SOLUTION INTRAMUSCULAR; INTRAVENOUS; SUBCUTANEOUS at 18:49

## 2025-04-09 RX ADMIN — HYDROCODONE BITARTRATE AND ACETAMINOPHEN 1 TABLET: 5; 325 TABLET ORAL at 06:37

## 2025-04-09 RX ADMIN — KETOROLAC TROMETHAMINE 15 MG: 15 INJECTION, SOLUTION INTRAMUSCULAR; INTRAVENOUS at 00:23

## 2025-04-09 NOTE — PLAN OF CARE
"Goal Outcome Evaluation:  Plan of Care Reviewed With: patient        Progress: improving  Outcome Evaluation: IV dilaudid x2 for abd and back pain level \"8 to 10\",  norco discontinued and percocet ordered.  resting quietly this afternoon, states pain better, about a \"6\". moderate amt output from CHIOMA drain.  voiding.  passing flatus.  IS to 2000.  walked in cantor x2.                             "

## 2025-04-09 NOTE — PROGRESS NOTES
Postoperative day 2 status post laparoscopic hysterectomy and bilateral salpingectomy laparoscopic sigmoid colectomy    S: No events overnight.  Patient is passing gas, no bowel movement yet.  Abdominal pain is moderately controlled.  Tolerating diet    O:   Vitals:    04/08/25 2036 04/09/25 0044 04/09/25 0500 04/09/25 1113   BP: 95/60 97/62 103/65 98/62   BP Location: Right arm Right arm Right arm Right arm   Patient Position: Lying Lying Lying Lying   Pulse: 75 68 70 79   Resp: 16 16 16 16   Temp: 97.7 °F (36.5 °C) 98 °F (36.7 °C) 98.3 °F (36.8 °C) 97.4 °F (36.3 °C)   TempSrc: Oral Oral Oral Oral   SpO2: 94% 95% 97% 92%   Abelardo drain with 200 cc of serosanguineous fluid  Alert acute distress  Breathing comfortable  Regular rate rhythm  Abdomen soft, mildly tender to palpation over incisions, no rebound or guarding no peritoneal sign    Chloride 108, otherwise normal BMP, blood cell count 8.6 from 13.2, hemoglobin 11 from 12.3, otherwise stable CBC    Pathology report:   Final Diagnosis   1.  Uterus, cervix, bilateral fallopian tubes, hysterectomy and bilateral salpingectomy (199 g):               - Benign proliferative phase endometrium.               - Benign endo-and ectocervix, negative for dysplasia.               - Bilateral benign fimbriated fallopian tubes with paratubal cyst.     2.  Sigmoid colon, segmental resection:               - Diverticulitis and diverticulosis with organizing peritonitis and adhesions.               - Foreign body giant cells suggestive of remote perforation.               - Viable margins of resection with serositis.               - Negative for malignancy.     3.  Rectum, annular excision:               - Benign colonic mucosa with focal hyperplastic change.     4.  Proximal colon, annular excision:               - Benign colonic mucosa with congestion and reactive changes.     Assessment and plan    Postoperative day 2, progressing appropriately.  Tolerating regular diet.  Pain  is well controlled on the current regimen.  Will DC IV fluids, continue regular diet, continue drain in place  Ambulate, SCDs and Lovenox    Will continue to follow, hopefully home in 1 or 2 days

## 2025-04-09 NOTE — PROGRESS NOTES
GYN Note    Patient with moderate pain and having some issues with pain control; however, she is ambulating, voiding and has appetite.  She has passed flatus.  VSS, afebrile.  No fever noted.  Abdomen - incisions look good.  WBC 8.6 today which Is decreased from 13.2  Pathology benign.  POD #2 s/p laparoscopic hysterectomy and partial colon resection.  - Doing well.  Advance diet per General Surgery.  - Pathology benign and reviewed with patient.  - Will follow up with me in 10 days.    Percy Mcintyre MD

## 2025-04-09 NOTE — PLAN OF CARE
Problem: Adult Inpatient Plan of Care  Goal: Plan of Care Review  Outcome: Progressing  Flowsheets (Taken 4/9/2025 8630)  Progress: improving  Outcome Evaluation: coping well, adequate pain relief with Norco and scheduyled Tylenol and Toradol, IV Dilaudid given x2 for breakthrough pain, Zofran x1 for nausea, no vomiting, voiding, passed flatus, ambulated with assist x1, IS 1500, incision CDI, some bruising, moist dressing, CHIOMA site, with small serous sanguinous drainage, VSS  Plan of Care Reviewed With: patient   Goal Outcome Evaluation:  Plan of Care Reviewed With: patient        Progress: improving  Outcome Evaluation: coping well, adequate pain relief with Norco and scheduyled Tylenol and Toradol, IV Dilaudid given x2 for breakthrough pain, Zofran x1 for nausea, no vomiting, voiding, passed flatus, ambulated with assist x1, IS 1500, incision CDI, some bruising, moist dressing, CHIOMA site, with small serous sanguinous drainage, VSS

## 2025-04-10 PROCEDURE — 25010000002 HYDROMORPHONE PER 4 MG: Performed by: SURGERY

## 2025-04-10 PROCEDURE — 25010000002 ENOXAPARIN PER 10 MG: Performed by: SURGERY

## 2025-04-10 PROCEDURE — 99024 POSTOP FOLLOW-UP VISIT: CPT | Performed by: SURGERY

## 2025-04-10 PROCEDURE — 99024 POSTOP FOLLOW-UP VISIT: CPT | Performed by: OBSTETRICS & GYNECOLOGY

## 2025-04-10 RX ORDER — OXYCODONE AND ACETAMINOPHEN 10; 325 MG/1; MG/1
1 TABLET ORAL EVERY 4 HOURS PRN
Refills: 0 | Status: DISCONTINUED | OUTPATIENT
Start: 2025-04-10 | End: 2025-04-12 | Stop reason: HOSPADM

## 2025-04-10 RX ORDER — OXYCODONE AND ACETAMINOPHEN 5; 325 MG/1; MG/1
1 TABLET ORAL EVERY 4 HOURS PRN
Refills: 0 | Status: DISCONTINUED | OUTPATIENT
Start: 2025-04-10 | End: 2025-04-12 | Stop reason: HOSPADM

## 2025-04-10 RX ORDER — OXYCODONE AND ACETAMINOPHEN 5; 325 MG/1; MG/1
1 TABLET ORAL EVERY 4 HOURS PRN
Refills: 0 | Status: DISCONTINUED | OUTPATIENT
Start: 2025-04-10 | End: 2025-04-10

## 2025-04-10 RX ADMIN — HYDROMORPHONE HYDROCHLORIDE 0.5 MG: 1 INJECTION, SOLUTION INTRAMUSCULAR; INTRAVENOUS; SUBCUTANEOUS at 01:02

## 2025-04-10 RX ADMIN — ACETAMINOPHEN 1000 MG: 500 TABLET ORAL at 09:22

## 2025-04-10 RX ADMIN — ENOXAPARIN SODIUM 40 MG: 100 INJECTION SUBCUTANEOUS at 09:22

## 2025-04-10 RX ADMIN — CLONAZEPAM 1 MG: 1 TABLET ORAL at 09:51

## 2025-04-10 RX ADMIN — VENLAFAXINE HYDROCHLORIDE 150 MG: 150 CAPSULE, EXTENDED RELEASE ORAL at 09:22

## 2025-04-10 RX ADMIN — OLANZAPINE 5 MG: 2.5 TABLET, FILM COATED ORAL at 22:17

## 2025-04-10 RX ADMIN — HYDROMORPHONE HYDROCHLORIDE 0.5 MG: 1 INJECTION, SOLUTION INTRAMUSCULAR; INTRAVENOUS; SUBCUTANEOUS at 06:10

## 2025-04-10 RX ADMIN — OXYCODONE AND ACETAMINOPHEN 1 TABLET: 325; 10 TABLET ORAL at 20:16

## 2025-04-10 RX ADMIN — OXYCODONE AND ACETAMINOPHEN 1 TABLET: 325; 10 TABLET ORAL at 15:57

## 2025-04-10 RX ADMIN — OXYCODONE AND ACETAMINOPHEN 1 TABLET: 5; 325 TABLET ORAL at 09:22

## 2025-04-10 RX ADMIN — OXYCODONE AND ACETAMINOPHEN 1 TABLET: 5; 325 TABLET ORAL at 03:59

## 2025-04-10 RX ADMIN — LAMOTRIGINE 400 MG: 100 TABLET ORAL at 06:09

## 2025-04-10 RX ADMIN — VENLAFAXINE HYDROCHLORIDE 150 MG: 150 CAPSULE, EXTENDED RELEASE ORAL at 22:17

## 2025-04-10 RX ADMIN — LAMOTRIGINE 200 MG: 100 TABLET ORAL at 22:17

## 2025-04-10 NOTE — PROGRESS NOTES
[facility]     Progress Note    Patient Name: Deedee Lang  : 1980  MRN: [id number]  Primary Care Physician:  Aniya Garcia APRN  Date of admission: 2025    Subjective   Subjective     Chief Complaint: Chronic pelvic pain     HPI:  Deedee Lang is a 45 y.o. female POD 3 s/p TLH, bilateral salpingectomy, laparoscopic resection of sigmoid colon for chronic pelvic pain and diverticular disease. She reports her pain is well controlled on medications. Switched to PO this morning. Tolerated turkey sausage. Has passed flatus since surgery, no nausea      Objective   Objective     Vitals:   Temp:  [97 °F (36.1 °C)-98 °F (36.7 °C)] 97.5 °F (36.4 °C)  Heart Rate:  [73-89] 86  Resp:  [16] 16  BP: (106-135)/(66-88) 115/75  Physical Exam    Constitutional: Awake, alert   Eyes: PERRLA, sclerae anicteric, no conjunctival injection   HENT: NCAT,    Respiratory: nonlabored respirations    Gastrointestinal: Soft, nontender, nondistended, incisions healing well without drainage or erythema or induration; bruising noted around incisions. Drain in place with small amount of serosanguinous fluid   Musculoskeletal: No bilateral ankle edema, no clubbing or cyanosis to extremities   Psychiatric: Appropriate affect, cooperative   Neurologic: Alert, awake, oriented   Skin: No rashes     Result Review    Result Review:  I have personally reviewed the results from the time of this admission to 4/10/2025 11:48 EDT and agree with these findings:  [x]  Laboratory list / accordion  []  Microbiology  []  Radiology  []  EKG/Telemetry   []  Cardiology/Vascular   [x]  Pathology  []  Old records  []  Other:  Most notable findings include: Hemoglobin 11.0, WBC 8.66  Pathology benign      Assessment & Plan   Assessment / Plan     Brief Patient Summary:  Deedee Lang is a 45 y.o. female POD 3 s/p TLH, bilateral salpingectomy, resection of sigmoid for chronic pelvic pain and diverticular disease    Active Hospital  Problems:  Active Hospital Problems    Diagnosis     **Pelvic pain     Diverticulitis large intestine        Plan:   Pain control on PO medications to see if adequate over course of day.  Continue to advance diet as tolerated  Drain in place, manage per General Surgery  Anticipate discharge in 1-2 days.     DVT prophylaxis:  Pharmacologic & mechanical VTE prophylaxis orders are present.    CODE STATUS:   Code Status (Patient has no pulse and is not breathing): CPR (Attempt to Resuscitate)  Medical Interventions (Patient has pulse or is breathing): Full Support  Level Of Support Discussed With: Patient   Next of Kin (If No Surrogate)        Jillian Campbell MD

## 2025-04-10 NOTE — PLAN OF CARE
Goal Outcome Evaluation:              Outcome Evaluation: VSS. Home dose klonopin given for c/o anxiety. Pain controlled with PO pain med. CHIOMA dressing changed. Ambulated in cantor x2. Passing flatus, no BM.

## 2025-04-10 NOTE — PLAN OF CARE
Problem: Adult Inpatient Plan of Care  Goal: Plan of Care Review  Outcome: Progressing  Flowsheets (Taken 4/10/2025 0426)  Outcome Evaluation: patient very anxious, irritable and frustrated due to pain mostly of her back, reports history of back pains, she could not get cmfortable in bed or in the chair, Clonopin home dose reordered, medicated iwth IV Dilaudid and Percocet alternately, patient finally calmed down and slept, ambulated x2, voiding, CHIOMA with moderate serous sanguimous output, incisions CDI, audible bowel sounds, passing flatus, no BM yet, VSS  Plan of Care Reviewed With: patient   Goal Outcome Evaluation:  Plan of Care Reviewed With: patient        Progress: improving  Outcome Evaluation: patient very anxious, irritable and frustrated due to pain mostly of her back, reports history of back pains, she could not get cmfortable in bed or in the chair, Clonopin home dose reordered, medicated iwth IV Dilaudid and Percocet alternately, patient finally calmed down and slept, ambulated x2, voiding, CHIOMA with moderate serous sanguimous output, incisions CDI, audible bowel sounds, passing flatus, no BM yet, VSS

## 2025-04-10 NOTE — PROGRESS NOTES
Postoperative day 3 s/p laparoscopic hysterectomy and bilateral salpingectomy as well as laparoscopic sigmoid colon resection.      S: Pain remained the same.  Tolerating regular diet.  Patient is passing gas, no bowel movement yet.  Denies any nausea or vomiting    O:   Vitals:    04/09/25 2022 04/10/25 0105 04/10/25 0403 04/10/25 1002   BP: 108/66 135/88 107/70 115/75   BP Location: Right arm Right arm Right arm Right arm   Patient Position: Lying Lying  Lying   Pulse: 73 89 81 86   Resp: 16 16 16 16   Temp: 98 °F (36.7 °C) 97 °F (36.1 °C) 97.7 °F (36.5 °C) 97.5 °F (36.4 °C)   TempSrc: Oral Oral Oral Oral   SpO2: 99% 99% 98% 95%   Weight:       Height:          Abelardo drain 200 cc serosanguineous  Alert, no acute distress  Breathing comfortable  Abdomen soft, probably tender, nondistended, incision clean dry intact    No labs today    Postoperative day 3 status post laparoscopic hysterectomy, bilateral salpingectomy and sigmoid colon resection.  Slowly progress with bowel function but no bowel movement.  Pain seems to be well-controlled.  Tolerating diet.    - Continue regular diet  - Ambulate, SCDs and Lovenox  - Awaiting complete return of bowel function  -Hopeful discharge in 24 to 48 hours

## 2025-04-11 PROCEDURE — 99024 POSTOP FOLLOW-UP VISIT: CPT | Performed by: SURGERY

## 2025-04-11 PROCEDURE — 25010000002 ENOXAPARIN PER 10 MG: Performed by: SURGERY

## 2025-04-11 PROCEDURE — 99024 POSTOP FOLLOW-UP VISIT: CPT | Performed by: OBSTETRICS & GYNECOLOGY

## 2025-04-11 RX ORDER — OXYCODONE AND ACETAMINOPHEN 5; 325 MG/1; MG/1
1 TABLET ORAL EVERY 4 HOURS PRN
Qty: 15 TABLET | Refills: 0 | Status: SHIPPED | OUTPATIENT
Start: 2025-04-11 | End: 2025-04-14 | Stop reason: SDUPTHER

## 2025-04-11 RX ORDER — ONDANSETRON 4 MG/1
4 TABLET, ORALLY DISINTEGRATING ORAL EVERY 6 HOURS PRN
Qty: 30 TABLET | Refills: 1 | Status: SHIPPED | OUTPATIENT
Start: 2025-04-11

## 2025-04-11 RX ORDER — AMOXICILLIN 250 MG
1 CAPSULE ORAL 2 TIMES DAILY
Status: DISCONTINUED | OUTPATIENT
Start: 2025-04-11 | End: 2025-04-12 | Stop reason: HOSPADM

## 2025-04-11 RX ADMIN — OLANZAPINE 5 MG: 2.5 TABLET, FILM COATED ORAL at 21:18

## 2025-04-11 RX ADMIN — OXYCODONE AND ACETAMINOPHEN 1 TABLET: 325; 10 TABLET ORAL at 08:50

## 2025-04-11 RX ADMIN — ENOXAPARIN SODIUM 40 MG: 100 INJECTION SUBCUTANEOUS at 08:33

## 2025-04-11 RX ADMIN — CLONAZEPAM 1 MG: 1 TABLET ORAL at 00:29

## 2025-04-11 RX ADMIN — OXYCODONE AND ACETAMINOPHEN 1 TABLET: 325; 10 TABLET ORAL at 17:41

## 2025-04-11 RX ADMIN — VENLAFAXINE HYDROCHLORIDE 150 MG: 150 CAPSULE, EXTENDED RELEASE ORAL at 21:18

## 2025-04-11 RX ADMIN — VENLAFAXINE HYDROCHLORIDE 150 MG: 150 CAPSULE, EXTENDED RELEASE ORAL at 08:33

## 2025-04-11 RX ADMIN — OXYCODONE AND ACETAMINOPHEN 1 TABLET: 325; 10 TABLET ORAL at 13:21

## 2025-04-11 RX ADMIN — LAMOTRIGINE 400 MG: 100 TABLET ORAL at 06:38

## 2025-04-11 RX ADMIN — OXYCODONE AND ACETAMINOPHEN 1 TABLET: 325; 10 TABLET ORAL at 04:45

## 2025-04-11 RX ADMIN — SENNOSIDES AND DOCUSATE SODIUM 1 TABLET: 50; 8.6 TABLET ORAL at 21:18

## 2025-04-11 RX ADMIN — LAMOTRIGINE 200 MG: 100 TABLET ORAL at 21:18

## 2025-04-11 RX ADMIN — SENNOSIDES AND DOCUSATE SODIUM 1 TABLET: 50; 8.6 TABLET ORAL at 11:37

## 2025-04-11 RX ADMIN — CLONAZEPAM 1 MG: 1 TABLET ORAL at 18:38

## 2025-04-11 NOTE — PROGRESS NOTES
"  Subjective     Subjective  Patient reports:  Patient states that her pain control has improved compared to yesterday morning.  She has been ambulating around the floor.  She continues to pass gas but has not had a bowel movement.  She is voiding without difficulty..    Objective     Objective:  Vital signs (most recent): Blood pressure 104/70, pulse 91, temperature 97.2 °F (36.2 °C), temperature source Oral, resp. rate 16, height 175.3 cm (69\"), weight 104 kg (229 lb 4.8 oz), last menstrual period 03/31/2025, SpO2 98%, not currently breastfeeding.    Physical Exam:  Incisions: clean, dry, and intact, healing well, no drainage, no erythema   Lungs: clear to auscultation   Abdomen: abdomen is soft without significant tenderness, masses, organomegaly or guarding.   Extremities:  extremities normal, atraumatic, no cyanosis or edema           Vital Sign Min/Max    Temp  Min: 97.2 °F (36.2 °C)  Max: 97.7 °F (36.5 °C)   Pulse  Min: 79  Max: 91   Resp  Min: 16  Max: 16   BP  Min: 103/72  Max: 122/74   No data recorded   SpO2  Min: 93 %  Max: 98 %   No data recorded     Flowsheet Rows      Flowsheet Row First Filed Value   Admission Height 175.3 cm (69\") Documented at 04/09/2025 1300   Admission Weight 104 kg (229 lb 4.8 oz) Documented at 04/09/2025 1300            Intake/Output last 24 hours:    Intake/Output Summary (Last 24 hours) at 4/11/2025 1146  Last data filed at 4/11/2025 0910  Gross per 24 hour   Intake --   Output 2515 ml   Net -2515 ml       Intake/Output this shift:  I/O this shift:  In: -   Out: 385 [Urine:350; Drains:35]    Labs/Studies reviewed:  .  No new labs  Radiology studies reviewed:   No new radiographic studies  Medications reviewed:   Yes     Assessment & Plan     Post op day 4 Procedure(s):  TOTAL LAPAROSCOPIC HYSTERECTOMY BILATERAL SALPINGECTOMY  COLON RESECTION LAPAROSCOPIC SIGMOID Doing well postoperatively..  She appears to be improved and tolerating oral pain medication.  CHIOMA drain amount has " diminished steadily and only 35 cc emptied this morning.  Patient expresses desire for discharge home.    Plan:  plan for discharge today,  pending approval from Dr. Cramer.  Patient also asks about a stool softener and I think this would be appropriate.  If discharged home today, will see Dr. Mcintyre in 2 weeks for incision check.  I will tentatively prepare discharge orders and instructions              Marvin Esparza MD  04/11/25  11:46 EDT

## 2025-04-11 NOTE — PROGRESS NOTES
Postoperative day 4 status post laparoscopic hysterectomy and bilateral salpingectomy as well as laparoscopic sigmoid colon resection    S: No events overnight.  Passing gas, no bowel movement yet.  Tolerating regular diet.  Abdominal pain significantly improved    O:   Vitals:    04/10/25 1828 04/10/25 2011 04/11/25 0449 04/11/25 1015   BP: 104/63 122/74 103/72 104/70   BP Location: Right arm Right arm Right arm Right arm   Patient Position: Lying Lying Lying Lying   Pulse: 79 83 81 91   Resp: 16 16 16 16   Temp: 97.7 °F (36.5 °C) 97.6 °F (36.4 °C) 97.2 °F (36.2 °C) 97.2 °F (36.2 °C)   TempSrc: Oral Oral Oral Oral   SpO2: 93% 95% 95% 98%   Weight:       Height:          Drain 4 cc serous  Alert, no acute distress  Abdomen soft mildly tender, incisions clean dry and intact    Assessment and plan    45-year-old female status post sigmoid colon resection, hysterectomy.  She is doing much better today.  Still no bowel movement.    Remove CHIOMA drain  Continue regular diet  Stool softeners  Can be ready to go home whenever having bowel movements.  SCDs and Lovenox    Will continue to follow    Ross Cramer MD, FACS  General, Minimally Invasive and Endoscopic Surgery  [facility], [place], operated by [facility]    [address] [place] [address], [address]  [place], [place], [address]  P: [phone]  F: [phone]

## 2025-04-11 NOTE — PLAN OF CARE
Goal Outcome Evaluation:  Plan of Care Reviewed With: patient        Progress: improving  Outcome Evaluation: ambulating in cantor with SBA.  c/o feeling dizzy x1 this afternoon.  BP stable.  prn percocet for analgesia.  voiding.  CHIOMA drain removed per order.  passing flatus.  started on stool softner.  OK for DC from gyn standpoint but defer to gen. surgeon.  surgery says to await BM/bowel function.  encouraged PO fluids and ambulation.

## 2025-04-11 NOTE — PLAN OF CARE
Goal Outcome Evaluation:  Plan of Care Reviewed With: patient        Progress: improving  Outcome Evaluation: Ambulating. Passing flatus. No stool. Incisions and lap sites intact with surgical glue. CHIOMA x1 with small amts of serosangineous drainage. Medicated with percocet for pain. No c/o nausea. Tolerating po. Afebrile. VS stable.

## 2025-04-12 ENCOUNTER — READMISSION MANAGEMENT (OUTPATIENT)
Dept: CALL CENTER | Facility: HOSPITAL | Age: 45
End: 2025-04-12
Payer: COMMERCIAL

## 2025-04-12 VITALS
WEIGHT: 229.3 LBS | BODY MASS INDEX: 33.96 KG/M2 | HEIGHT: 69 IN | RESPIRATION RATE: 16 BRPM | HEART RATE: 72 BPM | DIASTOLIC BLOOD PRESSURE: 69 MMHG | TEMPERATURE: 97.4 F | OXYGEN SATURATION: 97 % | SYSTOLIC BLOOD PRESSURE: 99 MMHG

## 2025-04-12 PROBLEM — N93.8 DYSFUNCTIONAL UTERINE BLEEDING: Status: RESOLVED | Noted: 2023-10-03 | Resolved: 2025-04-12

## 2025-04-12 PROBLEM — K57.32 DIVERTICULITIS OF SIGMOID COLON: Status: RESOLVED | Noted: 2025-02-21 | Resolved: 2025-04-12

## 2025-04-12 PROBLEM — K57.32 DIVERTICULITIS LARGE INTESTINE: Status: RESOLVED | Noted: 2025-04-07 | Resolved: 2025-04-12

## 2025-04-12 PROCEDURE — 99024 POSTOP FOLLOW-UP VISIT: CPT | Performed by: SURGERY

## 2025-04-12 PROCEDURE — 25010000002 ENOXAPARIN PER 10 MG: Performed by: SURGERY

## 2025-04-12 RX ORDER — POLYETHYLENE GLYCOL 3350 17 G/17G
17 POWDER, FOR SOLUTION ORAL ONCE
Status: COMPLETED | OUTPATIENT
Start: 2025-04-12 | End: 2025-04-12

## 2025-04-12 RX ORDER — AMOXICILLIN 250 MG
2 CAPSULE ORAL DAILY PRN
Qty: 30 TABLET | Refills: 1 | Status: SHIPPED | OUTPATIENT
Start: 2025-04-12 | End: 2026-04-12

## 2025-04-12 RX ADMIN — OXYCODONE AND ACETAMINOPHEN 1 TABLET: 325; 10 TABLET ORAL at 12:54

## 2025-04-12 RX ADMIN — POLYETHYLENE GLYCOL 3350 17 G: 17 POWDER, FOR SOLUTION ORAL at 14:16

## 2025-04-12 RX ADMIN — OXYCODONE AND ACETAMINOPHEN 1 TABLET: 325; 10 TABLET ORAL at 02:01

## 2025-04-12 RX ADMIN — OXYCODONE AND ACETAMINOPHEN 1 TABLET: 325; 10 TABLET ORAL at 06:49

## 2025-04-12 RX ADMIN — ENOXAPARIN SODIUM 40 MG: 100 INJECTION SUBCUTANEOUS at 09:16

## 2025-04-12 RX ADMIN — VENLAFAXINE HYDROCHLORIDE 150 MG: 150 CAPSULE, EXTENDED RELEASE ORAL at 09:16

## 2025-04-12 RX ADMIN — SENNOSIDES AND DOCUSATE SODIUM 1 TABLET: 50; 8.6 TABLET ORAL at 09:16

## 2025-04-12 RX ADMIN — LAMOTRIGINE 400 MG: 100 TABLET ORAL at 06:44

## 2025-04-12 NOTE — OUTREACH NOTE
Prep Survey      Flowsheet Row Responses   [facility] facility patient discharged from? [place]   Is LACE score < 7 ? No   Eligibility Readm Mgmt   Discharge diagnosis Pelvic pain   Does the patient have one of the following disease processes/diagnoses(primary or secondary)? Other   Prep survey completed? Yes            Monalisa CASTELLON - Registered Nurse

## 2025-04-12 NOTE — PROGRESS NOTES
Postoperative day 5 status post laparoscopic hysterectomy and bilateral salpingectomy and laparoscopic sigmoid colectomy    S: No events overnight.  Passing gas, no bowel movement.  Tolerating diet.  Minimal abdominal pain.    O:   Vitals:    04/11/25 1836 04/11/25 2129 04/12/25 0503 04/12/25 1010   BP: 93/59 119/79 113/67 99/69   BP Location: Right arm Right arm Right arm Right arm   Patient Position: Lying Lying Lying Lying   Pulse: 74 75 80 72   Resp: 16 16 16 16   Temp: 97 °F (36.1 °C) 97.5 °F (36.4 °C) 98 °F (36.7 °C) 97.4 °F (36.3 °C)   TempSrc: Oral Oral Oral Oral   SpO2: 98% 97% 96% 97%   Weight:       Height:          Alert, no acute distress  Breathing comfortable  Abdomen soft and nontender    Assessment and plan    45-year-old female status post laparoscopic sigmoid colectomy and hysterectomy.  She is doing really well.  No bowel function and passing lots of gas.  She is tolerating regular diet.  Her abdominal exam is benign.  The patient is anxious to go home.  I think that she is safe to go home.  Discussed with the patient about the importance to start taking stool softener and MiraLAX daily to help with bowel function.  She understands that she may need to come back to the hospital if unable to have a bowel movement and developing worsening pain, fever or any other abnormal finding.    She understood  Follow-up in my office in 2 weeks

## 2025-04-12 NOTE — PLAN OF CARE
Goal Outcome Evaluation: Patient a&ox4, vss. Pt has small incisions and lap sites w/ glue, dry and intact. Percocet given for pain. Still no bm, pt passing gas. Dr. Cramer approved for pt to be discharged. PIV removed. Medications reviewed w/ patient, discharge education complete. No questions or concerns at this time. Plan of care will continue.

## 2025-04-12 NOTE — DISCHARGE INSTRUCTIONS
COLON, GASTRIC OR SMALL BOWEL RESECTION  POST OP RECOMMENDATIONS  Dr. Ross Cramer  [phone]  ACTIVITIES:  Expect to rest most of the first week after discharge from hospital, but do get up several times daily to reduce the risk of getting a clot in your legs.  No strenuous activity or lifting over 10 lbs. for two weeks.  Add 5 lbs. a week to that restriction until 6 weeks out from surgery.  Try to avoid squatting and deep bends for about a week.  No driving until seen at your post operative appointment.  You must be off pain meds 24 hours before driving after that visit unless otherwise instructed.  You can climb stairs, but minimize this and initially do one step at a time (both feet on one step rather than going up with each step.)  SYMPTOMS:  Avoiding constipation is important after this surgery as it is common when taking pain medication.  Over the counter laxatives, such as Miralax, can be used temporarily to avoid this.   Fatigue and decreased stamina is not unusual for about a week or so after surgery due to anesthesia.  Try to take walks and some mild activity between resting.  Shoulder pain is not unusual from the “gas” used in laparoscopy which will dissipate within 1-3 days.  If it does not, call your physician.  It is not unusual for your gastrointestinal system to take 1-2 months to get back to your normal routine and you may have long term changes towards looser bowel movements  WOUND SITE:  Dressings can be removed 7-10 days after procedure if desired.  The “tega-derm” may be removed in 3 days if instructed to.  Dressings may occasionally have spots of blood on them.  As long as it is dry, these do not need to be changed.  If it is soaked, then the dressing should be removed and a new dressing placed.  Skin irritation, redness or itching can prompt removal of the bandage earlier if present.  Steri-strips are to be left in place until they fall off on their own in 1-2 weeks.  If they are irritating  then they may be removed sooner.  No showering if a drain is in place. Once removed, and the skin is covered, you may shower.    Showering may occur while the “tega-derm” is in place.  If it is off, then you must wait 3 days after surgery before showering.  MEALS:  A soft diet is recommended for the first 1-2 days after discharge from the hospital.  A normal diet may then be started as tolerated after that. Follow dietary guidelines when specified  Expect to eat less after this procedure and fill up somewhat faster.   Do NOT take pain pills on an empty stomach.  WORK:  In general if you have a sedentary job, you can return to work in 3 weeks if done laparoscopically.  If lifting or exertion is required it may be 3-6 weeks depending on your recovery.    Use discretion and remember that your stamina will be decreased post operatively.  Return to work notes can be provided at the time of your post-operative appointment.  FOLLOW UP:  Call and make a post-operative appointment for approximately 10-14 days after the procedure.

## 2025-04-12 NOTE — PLAN OF CARE
Goal Outcome Evaluation:  Plan of Care Reviewed With: patient        Progress: improving  Outcome Evaluation: Small incisions and lap sites intact with surgical glue. Continues to pass gas but no bm. No c/o nausea. Medicated with percocet for pain. Afebrile and VS stable.

## 2025-04-14 ENCOUNTER — TELEPHONE (OUTPATIENT)
Dept: SURGERY | Facility: CLINIC | Age: 45
End: 2025-04-14
Payer: COMMERCIAL

## 2025-04-14 DIAGNOSIS — K57.20 DIVERTICULITIS OF LARGE INTESTINE WITH PERFORATION WITHOUT ABSCESS OR BLEEDING: ICD-10-CM

## 2025-04-14 DIAGNOSIS — Z90.710 S/P LAPAROSCOPIC HYSTERECTOMY: ICD-10-CM

## 2025-04-14 RX ORDER — OXYCODONE AND ACETAMINOPHEN 5; 325 MG/1; MG/1
1 TABLET ORAL EVERY 4 HOURS PRN
Qty: 15 TABLET | Refills: 0 | Status: SHIPPED | OUTPATIENT
Start: 2025-04-14

## 2025-04-14 NOTE — PAYOR COMM NOTE
"Tierney Crawford (45 y.o. Female)    PLEASE SEE ATTACHED FOR DC NOTICE   REQUEST 1 ADDITIONAL DAY THROUGH 4/12  REF#[id number]   THANK YOU  CHELSI FARRIS RN/ DEPT   [facility]  PH: [phone]  FAX:  [phone]     Date of Birth   1980    Social Security Number       Address   [address]  [place] [address]    Home Phone   [phone]    MRN   [id number]       Cong[employer]ational   [employer]    Marital Status   Single                            Admission Date   4/7/2025    Admission Type   Elective    Admitting Provider   Percy Mcintyre MD    Attending Provider       Department, Room/Bed   [facility] [address] [place], 95/1       Discharge Date   4/12/2025    Discharge Disposition   Home or Self Care    Discharge Destination                                 Attending Provider: (none)   Allergies: Codeine, Gluten Meal    Isolation: None   Infection: None   Code Status: Prior    Ht: 175.3 cm (69\")   Wt: 104 kg (229 lb 4.8 oz)    Admission Cmt: None   Principal Problem: Pelvic pain [R10.2]                   Active Insurance as of 4/7/2025       Primary Coverage       Payor Plan Insurance Group Employer/Plan Group    [facility] [place] BY ERLINDA [id number]       Payor Plan Address Payor Plan Phone Number Payor Plan Fax Number Effective Dates    [address] [address]   1/1/2021 - None Entered    [place] [address]         Subscriber Name Subscriber Birth Date Member ID       TIERNEY CRAWFORD 1980 [id number]                     Emergency Contacts        (Rel.) Home Phone Work Phone Mobile Phone    MEMO GARSIA (Significant Other) [phone] -- [phone]    Mayra Crawford (Mother) [phone] -- [phone]              [place]: NPI [id number]  Tax ID [id number]  Medication Administration Report for Tierney Crawford as of 4/10/25 through 4/12/25     Legend:    Given Hold Not Given Due Canceled Entry Other Actions "    Time Time (Time) Time Time-Action         Discontinued     Completed     Future     MAR Hold     Linked             Medications 04/10/25 04/11/25 04/12/25     Completed Medications   acetaminophen (TYLENOL) tablet 1,000 mg  Dose: 1,000 mg  Freq: 3 times daily Route: PO  Start: 04/08/25 1500 End: 04/10/25 0922     Admin Instructions:   If given for fever, use fever parameter: fever greater than 100.4 °F  Based on patient request - if ordered for moderate or severe pain, provider allows for administration of a medication prescribed for a lower pain scale.    Do not exceed 4 grams of acetaminophen in a 24 hr period. Max dose of 2gm for AST/ALT greater than 120 units/L.    If given for pain, use the following pain scale:   Mild Pain = Pain Score of 1-3, CPOT 1-2  Moderate Pain = Pain Score of 4-6, CPOT 3-4  Severe Pain = Pain Score of 7-10, CPOT 5-8      0922-Given              ceFAZolin 2000 mg IVPB in 100 mL NS (MBP)  Dose: 2,000 mg  Freq: Once Route: IV  Indications of Use: PERIOPERATIVE PHARMACOPROPHYLAXIS  Start: 04/07/25 0631 End: 04/07/25 0758     Admin Instructions:   Caution: Look alike/sound alike drug alert           famotidine (PEPCID) injection 20 mg  Dose: 20 mg  Freq: Once Route: IV  Start: 04/07/25 0705 End: 04/07/25 0724     Admin Instructions:   Give IV push over 2 minutes.           ketorolac (TORADOL) injection 15 mg  Dose: 15 mg  Freq: Every 6 Hours Route: IV  Start: 04/07/25 1830 End: 04/09/25 1202     Admin Instructions:   Based on patient request - if ordered for moderate or severe pain, provider allows for administration of a medication prescribed for a lower pain scale.      If given for pain, use the following pain scale:  Mild Pain = Pain Score of 1-3, CPOT 1-2  Moderate Pain = Pain Score of 4-6, CPOT 3-4  Severe Pain = Pain Score of 7-10, CPOT 5-8           ondansetron (ZOFRAN) injection 4 mg  Dose: 4 mg  Freq: Once As Needed Route: IV  PRN Reasons: Nausea,Vomiting  Indications of Use:  POSTOPERATIVE NAUSEA AND VOMITING  Start: 04/07/25 1357 End: 04/07/25 1427     Admin Instructions:   If BOTH ondansetron (ZOFRAN) and promethazine (PHENERGAN) are ordered use ondansetron first and THEN promethazine IF ondansetron is ineffective.           polyethylene glycol (MIRALAX) packet 17 g  Dose: 17 g  Freq: Once Route: PO  Start: 04/12/25 1400 End: 04/12/25 1416     Admin Instructions:   Use 4-8 ounces of water, tea, or juice for each 17 gram dose.        1416-Given            Discontinued Medications  Medications 04/10/25 04/11/25 04/12/25      acetaminophen (TYLENOL) tablet 1,000 mg  Dose: 1,000 mg  Freq: Every 6 Hours Route: PO  Start: 04/07/25 1830 End: 04/08/25 1021     Admin Instructions:   If given for fever, use fever parameter: fever greater than 100.4 °F  Based on patient request - if ordered for moderate or severe pain, provider allows for administration of a medication prescribed for a lower pain scale.    Do not exceed 4 grams of acetaminophen in a 24 hr period. Max dose of 2gm for AST/ALT greater than 120 units/L.    If given for pain, use the following pain scale:   Mild Pain = Pain Score of 1-3, CPOT 1-2  Moderate Pain = Pain Score of 4-6, CPOT 3-4  Severe Pain = Pain Score of 7-10, CPOT 5-8           Atropine Sulfate (PF) injection 0.4 mg  Dose: 0.4 mg  Freq: Once As Needed Route: IV  PRN Reason: Bradycardia  PRN Comment: symptomatic bradycardia (hypotension, dizziness, confusion). Notify attending anesthesiologist.  Start: 04/07/25 1357 End: 04/07/25 1736           bupivacaine-EPINEPHrine PF (MARCAINE w/EPI) 0.25% [id number] injection  Freq: As Needed  Start: 04/07/25 0810 End: 04/07/25 1411           clonazePAM (KlonoPIN) tablet 1 mg  Dose: 1 mg  Freq: 2 Times Daily PRN Route: PO  PRN Reason: Anxiety  Start: 04/09/25 2129 End: 04/12/25 1633     Admin Instructions:   Caution: Look alike/sound alike drug alert. Please read the label.  Group 2 ([place]) Hazardous Drug - Reproductive Risk Only -  See Handling Guide      0951-Given          0029-Given     1137-Return to Cabinet [C]     1838-Given           diphenhydrAMINE (BENADRYL) injection 12.5 mg  Dose: 12.5 mg  Freq: Every 15 Minutes PRN Route: IV  PRN Reason: Itching  PRN Comment: May repeat x 1  Start: 04/07/25 1357 End: 04/07/25 1736     Admin Instructions:   Caution: Look alike/sound alike drug alert. This med may be ordered in other forms and routes. Before giving verify the last time the drug was given by any route/form.            droperidol (INAPSINE) injection 0.625 mg  Dose: 0.625 mg  Freq: Every 20 Minutes PRN Route: IV  PRN Reasons: Nausea,Vomiting  Indications of Use: NAUSEA AND VOMITING  Start: 04/07/25 1357 End: 04/07/25 1736     Admin Instructions:   Use ondansetron first; proceed to droperidol for nausea refractory to ondansetron.           Or   droperidol (INAPSINE) injection 0.625 mg  Dose: 0.625 mg  Freq: Every 20 Minutes PRN Route: IM  PRN Reasons: Nausea,Vomiting  Start: 04/07/25 1357 End: 04/07/25 1736     Admin Instructions:   Use ondansetron first; proceed to droperidol for nausea refractory to ondansetron.           enoxaparin sodium (LOVENOX) syringe 40 mg  Dose: 40 mg  Freq: Daily Route: SC  Indications of Use: PROPHYLAXIS OF VENOUS THROMBOEMBOLISM  Start: 04/08/25 0900 End: 04/12/25 1633     Admin Instructions:   Give subcutaneous in abdomen only. Do not massage site after injection.      0922-Given          0833-Given          0916-Given            ePHEDrine injection 5 mg  Dose: 5 mg  Freq: Once As Needed Route: IV  PRN Comment: symptomatic hypotension - Notify attending anesthesiologist if this needs to be given  Start: 04/07/25 1357 End: 04/07/25 1736     Admin Instructions:   Caution: Look alike/sound alike drug alert   Dilute with NS to 5-10 mg/mL.  Central line preferred, if unavailable use large bore IV access with frequent nurse monitoring of IV site.           fentaNYL citrate (PF) (SUBLIMAZE) injection 50  mcg  Dose: 50 mcg  Freq: Every 5 Minutes PRN Route: IV  PRN Reason: Severe Pain  Start: 04/07/25 1357 End: 04/07/25 1736     Admin Instructions:   Maximum total dose of fentanyl is 200 mcg.  If given for pain, use the following pain scale:  Mild Pain = Pain Score of 1-3, CPOT 1-2  Moderate Pain = Pain Score of 4-6, CPOT 3-4  Severe Pain = Pain Score of 7-10, CPOT 5-8           fentaNYL citrate (PF) (SUBLIMAZE) injection 50 mcg  Dose: 50 mcg  Freq: Once As Needed Route: IV  PRN Reason: Severe Pain  Start: 04/07/25 0703 End: 04/07/25 1411     Admin Instructions:   Maximum total dose of fentanyl is 50 mcg without additional orders from physician. May be used for preoperative pain or procedural sedation.  If given for pain, use the following pain scale:  Mild Pain = Pain Score of 1-3, CPOT 1-2  Moderate Pain = Pain Score of 4-6, CPOT 3-4  Severe Pain = Pain Score of 7-10, CPOT 5-8           flumazenil (ROMAZICON) injection 0.2 mg  Dose: 0.2 mg  Freq: As Needed Route: IV  PRN Comment: for benzodiazepine induced unresponsiveness or sedation  Start: 04/07/25 1357 End: 04/07/25 1736     Admin Instructions:   Notify Anesthesia if given  ** give IV over 15-30 seconds **           hydrALAZINE (APRESOLINE) injection 5 mg  Dose: 5 mg  Freq: Every 10 Minutes PRN Route: IV  PRN Reason: High Blood Pressure  PRN Comment: for systolic blood pressure greater than 180 mmHg or diastolic blood pressure greater than 105 mmHg  Start: 04/07/25 1357 End: 04/07/25 1736     Admin Instructions:   Up to 20 mg. If labetalol and hydralazine are both ordered, use labetalol first.  Caution: Look alike/sound alike drug alert           HYDROcodone-acetaminophen (NORCO) 5-325 MG per tablet 1 tablet  Dose: 1 tablet  Freq: Every 6 Hours PRN Route: PO  PRN Reason: Severe Pain  Start: 04/07/25 1403 End: 04/09/25 1248     Admin Instructions:   Based on patient request - if ordered for moderate or severe pain, provider allows for administration of a  medication prescribed for a lower pain scale.  [NOELLE]    Do not exceed 4 grams of acetaminophen in a 24 hr period. Max dose of 2gm for AST/ALT greater than 120 units/L        If given for pain, use the following pain scale:   Mild Pain = Pain Score of 1-3, CPOT 1-2  Moderate Pain = Pain Score of 4-6, CPOT 3-4  Severe Pain = Pain Score of 7-10, CPOT 5-8           HYDROcodone-acetaminophen (NORCO) 5-325 MG per tablet 1 tablet  Dose: 1 tablet  Freq: Once As Needed Route: PO  PRN Reason: Moderate Pain  Start: 04/07/25 1357 End: 04/07/25 1736     Admin Instructions:   Based on patient request - if ordered for moderate or severe pain, provider allows for administration of a medication prescribed for a lower pain scale.  [NOELLE]    Do not exceed 4 grams of acetaminophen in a 24 hr period. Max dose of 2gm for AST/ALT greater than 120 units/L        If given for pain, use the following pain scale:   Mild Pain = Pain Score of 1-3, CPOT 1-2  Moderate Pain = Pain Score of 4-6, CPOT 3-4  Severe Pain = Pain Score of 7-10, CPOT 5-8            HYDROmorphone (DILAUDID) injection 0.5 mg  Dose: 0.5 mg  Freq: Every 2 Hours PRN Route: IV  PRN Reason: Severe Pain  Start: 04/07/25 1741 End: 04/12/25 1633     Admin Instructions:   Based on patient request - if ordered for moderate or severe pain, provider allows for administration of a medication prescribed for a lower pain scale.  If given for pain, use the following pain scale:  Mild Pain = Pain Score of 1-3, CPOT 1-2  Moderate Pain = Pain Score of 4-6, CPOT 3-4  Severe Pain = Pain Score of 7-10, CPOT 5-8      0102-Given     0610-Given             And   naloxone (NARCAN) injection 0.4 mg  Dose: 0.4 mg  Freq: Every 5 Minutes PRN Route: IV  PRN Reason: Respiratory Depression  Start: 04/07/25 1741 End: 04/12/25 1633     Admin Instructions:   If respiratory rate is less than 8 breaths/minute or patient is difficult to arouse stop any narcotics and contact physician.   Administer slow IV push.  Repeat as ordered until patient's respiratory rate is greater than 12 breaths/minute.           HYDROmorphone (DILAUDID) injection 0.5 mg  Dose: 0.5 mg  Freq: Every 5 Minutes PRN Route: IV  PRN Reason: Moderate Pain  Start: 04/07/25 1357 End: 04/07/25 1736     Admin Instructions:   Maximum total dose of hydromorphone is 2 mg.  If given for pain, use the following pain scale:  Mild Pain = Pain Score of 1-3, CPOT 1-2  Moderate Pain = Pain Score of 4-6, CPOT 3-4  Severe Pain = Pain Score of 7-10, CPOT 5-8           ipratropium-albuterol (DUO-NEB) nebulizer solution 3 mL  Dose: 3 mL  Freq: Once As Needed Route: NEBULIZATION  PRN Reasons: Wheezing,Shortness of Air  PRN Comment: bronchospasm  Start: 04/07/25 1357 End: 04/07/25 1736     Admin Instructions:   Notify Anesthesia if minineb is given           labetalol (NORMODYNE,TRANDATE) injection 5 mg  Dose: 5 mg  Freq: Every 5 Minutes PRN Route: IV  PRN Reason: High Blood Pressure  PRN Comment: for systolic blood pressure greater than 180 mmHg or diastolic blood pressure greater than 105 mmHg  Start: 04/07/25 1357 End: 04/07/25 1736     Admin Instructions:   Hold for heart rate less than 60.    If labetalol and hydralazine are both ordered, use labetalol first. Maximum dose of labetalol is 20mg IV while in PACU, notify anesthesiologist for further orders if needed.  Give IV Push over 2 minutes.           lactated ringers infusion  Rate: 9 mL/hr Dose: 9 mL/hr  Freq: Continuous Route: IV  Start: 04/07/25 0705 End: 04/08/25 0723           lamoTRIgine (LaMICtal) tablet 200 mg  Dose: 200 mg  Freq: Nightly Route: PO  Start: 04/08/25 2100 End: 04/12/25 1633     Admin Instructions:   Caution: Look alike/sound alike drug alert      2217-Given          2118-Given             lamoTRIgine (LaMICtal) tablet 200 mg  Dose: 200 mg  Freq: Every Morning Route: PO  Start: 04/08/25 0700 End: 04/08/25 0802     Admin Instructions:   Caution: Look alike/sound alike drug alert            lamoTRIgine (LaMICtal) tablet 400 mg  Dose: 400 mg  Freq: Every Morning Route: PO  Start: 04/08/25 0900 End: 04/12/25 1633     Admin Instructions:   Caution: Look alike/sound alike drug alert      0609-Given          0638-Given          0644-Given            lidocaine (XYLOCAINE) 1 % injection 0.5 mL  Dose: 0.5 mL  Freq: Once As Needed Route: ID  PRN Comment: IV Start  Start: 04/07/25 0703 End: 04/07/25 1411           midazolam (VERSED) injection 1 mg  Dose: 1 mg  Freq: Every 5 Minutes PRN Route: IV  PRN Comment: Anxiety prophylaxis, Pre-op comfort  Start: 04/07/25 0703 End: 04/07/25 1411     Admin Instructions:   May repeat dose in 5 minutes one time then contact provider for additional orders.             naloxone (NARCAN) injection 0.2 mg  Dose: 0.2 mg  Freq: As Needed Route: IV  PRN Reasons: Opioid Reversal,Respiratory Depression  PRN Comment: unresponsiveness, decrease oxygen saturation  Indications of Use: ACUTE RESPIRATORY FAILURE,OPIOID-INDUCED RESPIRATORY DEPRESSION  Start: 04/07/25 1357 End: 04/07/25 1736     Admin Instructions:   Notify Anesthesia if given           OLANZapine (zyPREXA) tablet 5 mg  Dose: 5 mg  Freq: Nightly Route: PO  Start: 04/08/25 2115 End: 04/12/25 1633     Admin Instructions:   Caution: Look alike/sound alike drug alert      2217-Given          2118-Given              ondansetron ODT (ZOFRAN-ODT) disintegrating tablet 4 mg  Dose: 4 mg  Freq: Every 6 Hours PRN Route: PO  PRN Reasons: Nausea,Vomiting  Start: 04/07/25 1741 End: 04/12/25 1633     Admin Instructions:   Give Zofran first. Give Phenergan if Zofran not effective. Then if Phenergan not effective, give metoclopramide.  Place on tongue and allow to dissolve.           Or   ondansetron (ZOFRAN) injection 4 mg  Dose: 4 mg  Freq: Every 6 Hours PRN Route: IV  PRN Reasons: Nausea,Vomiting  Start: 04/07/25 1741 End: 04/12/25 1633     Admin Instructions:   Give Zofran first. Give Phenergan if Zofran not effective. Then if Phenergan  not effective, give metoclopramide.            oxyCODONE-acetaminophen (PERCOCET) 5-325 MG per tablet 1 tablet  Dose: 1 tablet  Freq: Every 4 Hours PRN Route: PO  PRN Reason: Moderate Pain  Start: 04/10/25 1255 End: 04/12/25 1633     Admin Instructions:   [NOELLE]    Do not exceed 4 grams of acetaminophen in a 24 hr period. Max dose of 2gm for AST/ALT greater than 120 units/L        If given for pain, use the following pain scale:   Mild Pain = Pain Score of 1-3, CPOT 1-2  Moderate Pain = Pain Score of 4-6, CPOT 3-4  Severe Pain = Pain Score of 7-10, CPOT 5-8      1557-Not Given:  See Alt     2016-Not Given:  See Alt         0445-Not Given:  See Alt     0850-Not Given:  See Alt     1321-Not Given:  See Alt       1741-Not Given:  See Alt          0201-Not Given:  See Alt     0649-Not Given:  See Alt     1254-Not Given:  See Alt          Or   oxyCODONE-acetaminophen (PERCOCET)  MG per tablet 1 tablet  Dose: 1 tablet  Freq: Every 4 Hours PRN Route: PO  PRN Reason: Severe Pain  Start: 04/10/25 1255 End: 04/12/25 1633     Admin Instructions:   Based on patient request - if ordered for moderate or severe pain, provider allows for administration of a medication prescribed for a lower pain scale.  [NOELLE]    Do not exceed 4 grams of acetaminophen in a 24 hr period. Max dose of 2gm for AST/ALT greater than 120 units/L        If given for pain, use the following pain scale:   Mild Pain = Pain Score of 1-3, CPOT 1-2  Moderate Pain = Pain Score of 4-6, CPOT 3-4  Severe Pain = Pain Score of 7-10, CPOT 5-8      1557-Given     2016-Given         0445-Given     0850-Given     1321-Given       1741-Given          0201-Given     0649-Given     1254-Given          oxyCODONE-acetaminophen (PERCOCET) 5-325 MG per tablet 1 tablet  Dose: 1 tablet  Freq: Every 4 Hours PRN Route: PO  PRN Reasons: Severe Pain,Moderate Pain  Start: 04/10/25 0836 End: 04/10/25 1256     Admin Instructions:   [NOELLE]    Do not exceed 4 grams of acetaminophen in a  24 hr period. Max dose of 2gm for AST/ALT greater than 120 units/L        If given for pain, use the following pain scale:   Mild Pain = Pain Score of 1-3, CPOT 1-2  Moderate Pain = Pain Score of 4-6, CPOT 3-4  Severe Pain = Pain Score of 7-10, CPOT 5-8      0922-Given              oxyCODONE-acetaminophen (PERCOCET) 5-325 MG per tablet 1 tablet  Dose: 1 tablet  Freq: Every 6 Hours PRN Route: PO  PRN Reasons: Severe Pain,Moderate Pain  Start: 04/09/25 1248 End: 04/10/25 0836     Admin Instructions:   [NOELLE]    Do not exceed 4 grams of acetaminophen in a 24 hr period. Max dose of 2gm for AST/ALT greater than 120 units/L        If given for pain, use the following pain scale:   Mild Pain = Pain Score of 1-3, CPOT 1-2  Moderate Pain = Pain Score of 4-6, CPOT 3-4  Severe Pain = Pain Score of 7-10, CPOT 5-8      0359-Given              oxyCODONE-acetaminophen (PERCOCET) 7.5-325 MG per tablet 1 tablet  Dose: 1 tablet  Freq: Every 4 Hours PRN Route: PO  PRN Reason: Severe Pain  Start: 04/07/25 1357 End: 04/07/25 1736     Admin Instructions:   [NOELLE]    Do not exceed 4 grams of acetaminophen in a 24 hr period. Max dose of 2gm for AST/ALT greater than 120 units/L        If given for pain, use the following pain scale:   Mild Pain = Pain Score of 1-3, CPOT 1-2  Moderate Pain = Pain Score of 4-6, CPOT 3-4  Severe Pain = Pain Score of 7-10, CPOT 5-8            promethazine (PHENERGAN) suppository 25 mg  Dose: 25 mg  Freq: Once As Needed Route: RE  PRN Reasons: Nausea,Vomiting  Start: 04/07/25 1357 End: 04/07/25 1736     Admin Instructions:   If BOTH ondansetron (ZOFRAN) and promethazine (PHENERGAN) are ordered use ondansetron first and THEN promethazine IF ondansetron is ineffective.           Or   promethazine (PHENERGAN) tablet 25 mg  Dose: 25 mg  Freq: Once As Needed Route: PO  PRN Reasons: Nausea,Vomiting  Start: 04/07/25 1357 End: 04/07/25 1056     Admin Instructions:   If BOTH ondansetron (ZOFRAN) and promethazine  (PHENERGAN) are ordered use ondansetron first and THEN promethazine IF ondansetron is ineffective.             sennosides-docusate (PERICOLACE) 8.6-50 MG per tablet 1 tablet  Dose: 1 tablet  Freq: 2 Times Daily Route: PO  Start: 04/11/25 1145 End: 04/12/25 1633       1137-Given     2118-Given         0916-Given            sodium chloride (NS) irrigation solution  Freq: As Needed  Start: 04/07/25 1310 End: 04/07/25 1411           sodium chloride 0.9 % flush 3 mL  Dose: 3 mL  Freq: Every 12 Hours Scheduled Route: IV  Start: 04/07/25 0900 End: 04/07/25 1411           sodium chloride 0.9 % flush 3-10 mL  Dose: 3-10 mL  Freq: As Needed Route: IV  PRN Reason: Line Care  Start: 04/07/25 0703 End: 04/07/25 1411           sodium chloride 0.9 % infusion  Rate: 100 mL/hr Dose: 100 mL/hr  Freq: Continuous Route: IV  Start: 04/07/25 1830 End: 04/09/25 1244           temazepam (RESTORIL) capsule 15 mg  Dose: 15 mg  Freq: Nightly PRN Route: PO  PRN Reason: Sleep  Start: 04/07/25 1748 End: 04/12/25 1633     Admin Instructions:   Group 2 ([place]) Hazardous Drug - Reproductive Risk Only - See Handling Guide           traMADol (ULTRAM) tablet 50 mg  Dose: 50 mg  Freq: Every 6 Hours PRN Route: PO  PRN Reason: Moderate Pain  Start: 04/07/25 1741 End: 04/12/25 1633     Admin Instructions:   Based on patient request - if ordered for moderate or severe pain, provider allows for administration of a medication prescribed for a lower pain scale.      Caution: Look alike/sound alike drug alert    If given for pain, use the following pain scale:  Mild Pain = Pain Score of 1-3, CPOT 1-2  Moderate Pain = Pain Score of 4-6, CPOT 3-4  Severe Pain = Pain Score of 7-10, CPOT 5-8           venlafaxine XR (EFFEXOR-XR) 24 hr capsule 150 mg  Dose: 150 mg  Freq: 2 Times Daily Route: PO  Start: 04/07/25 2100 End: 04/12/25 1633     Admin Instructions:   Do not crush or chew the capsules or tablets. The drug may not work as designed if the capsule or tablet is  crushed or chewed. Swallow whole.      0922-Given     2217-Given         0833-Given     2118-Given         0916-Given                           Physician Progress Notes (last 72 hours)        Ross Cramer MD at 04/12/25 1432          Postoperative day 5 status post laparoscopic hysterectomy and bilateral salpingectomy and laparoscopic sigmoid colectomy    S: No events overnight.  Passing gas, no bowel movement.  Tolerating diet.  Minimal abdominal pain.    O:   Vitals:    04/11/25 1836 04/11/25 2129 04/12/25 0503 04/12/25 1010   BP: 93/59 119/79 113/67 99/69   BP Location: Right arm Right arm Right arm Right arm   Patient Position: Lying Lying Lying Lying   Pulse: 74 75 80 72   Resp: 16 16 16 16   Temp: 97 °F (36.1 °C) 97.5 °F (36.4 °C) 98 °F (36.7 °C) 97.4 °F (36.3 °C)   TempSrc: Oral Oral Oral Oral   SpO2: 98% 97% 96% 97%   Weight:       Height:          Alert, no acute distress  Breathing comfortable  Abdomen soft and nontender    Assessment and plan    45-year-old female status post laparoscopic sigmoid colectomy and hysterectomy.  She is doing really well.  No bowel function and passing lots of gas.  She is tolerating regular diet.  Her abdominal exam is benign.  The patient is anxious to go home.  I think that she is safe to go home.  Discussed with the patient about the importance to start taking stool softener and MiraLAX daily to help with bowel function.  She understands that she may need to come back to the hospital if unable to have a bowel movement and developing worsening pain, fever or any other abnormal finding.    She understood  Follow-up in my office in 2 weeks    Electronically signed by Ross Cramer MD at 04/12/25 1454       Ross Cramer MD at 04/11/25 1232          Postoperative day 4 status post laparoscopic hysterectomy and bilateral salpingectomy as well as laparoscopic sigmoid colon resection    S: No events overnight.  Passing gas, no bowel movement  "yet.  Tolerating regular diet.  Abdominal pain significantly improved    O:   Vitals:    04/10/25 1828 04/10/25 2011 04/11/25 0449 04/11/25 1015   BP: 104/63 122/74 103/72 104/70   BP Location: Right arm Right arm Right arm Right arm   Patient Position: Lying Lying Lying Lying   Pulse: 79 83 81 91   Resp: 16 16 16 16   Temp: 97.7 °F (36.5 °C) 97.6 °F (36.4 °C) 97.2 °F (36.2 °C) 97.2 °F (36.2 °C)   TempSrc: Oral Oral Oral Oral   SpO2: 93% 95% 95% 98%   Weight:       Height:          Drain 4 cc serous  Alert, no acute distress  Abdomen soft mildly tender, incisions clean dry and intact    Assessment and plan    45-year-old female status post sigmoid colon resection, hysterectomy.  She is doing much better today.  Still no bowel movement.    Remove CHIOMA drain  Continue regular diet  Stool softeners  Can be ready to go home whenever having bowel movements.  SCDs and Lovenox    Will continue to follow    Ross Cramer MD, FACS  General, Minimally Invasive and Endoscopic Surgery  [facility]    [address], [address]  [place], [place], [address]  P: [phone]  F: [phone]      Electronically signed by Ross Cramer MD at 04/11/25 1234       Marvin Esparza MD at 04/11/25 1146            Subjective     Subjective  Patient reports:  Patient states that her pain control has improved compared to yesterday morning.  She has been ambulating around the floor.  She continues to pass gas but has not had a bowel movement.  She is voiding without difficulty..    Objective     Objective:  Vital signs (most recent): Blood pressure 104/70, pulse 91, temperature 97.2 °F (36.2 °C), temperature source Oral, resp. rate 16, height 175.3 cm (69\"), weight 104 kg (229 lb 4.8 oz), last menstrual period 03/31/2025, SpO2 98%, not currently breastfeeding.    Physical Exam:  Incisions: clean, dry, and intact, healing well, no drainage, no erythema   Lungs: clear to auscultation   Abdomen: abdomen is soft without " "significant tenderness, masses, organomegaly or guarding.   Extremities:  extremities normal, atraumatic, no cyanosis or edema           Vital Sign Min/Max    Temp  Min: 97.2 °F (36.2 °C)  Max: 97.7 °F (36.5 °C)   Pulse  Min: 79  Max: 91   Resp  Min: 16  Max: 16   BP  Min: 103/72  Max: 122/74   No data recorded   SpO2  Min: 93 %  Max: 98 %   No data recorded     Flowsheet Rows      Flowsheet Row First Filed Value   Admission Height 175.3 cm (69\") Documented at 04/09/2025 1300   Admission Weight 104 kg (229 lb 4.8 oz) Documented at 04/09/2025 1300            Intake/Output last 24 hours:    Intake/Output Summary (Last 24 hours) at 4/11/2025 1146  Last data filed at 4/11/2025 0910  Gross per 24 hour   Intake --   Output 2515 ml   Net -2515 ml       Intake/Output this shift:  I/O this shift:  In: -   Out: 385 [Urine:350; Drains:35]    Labs/Studies reviewed:  .  No new labs  Radiology studies reviewed:   No new radiographic studies  Medications reviewed:   Yes     Assessment & Plan     Post op day 4 Procedure(s):  TOTAL LAPAROSCOPIC HYSTERECTOMY BILATERAL SALPINGECTOMY  COLON RESECTION LAPAROSCOPIC SIGMOID Doing well postoperatively..  She appears to be improved and tolerating oral pain medication.  CHIOMA drain amount has diminished steadily and only 35 cc emptied this morning.  Patient expresses desire for discharge home.    Plan:  plan for discharge today,  pending approval from Dr. Cramer.  Patient also asks about a stool softener and I think this would be appropriate.  If discharged home today, will see Dr. Mcintyre in 2 weeks for incision check.  I will tentatively prepare discharge orders and instructions             Marvin Esparza MD  04/11/25  11:46 EDT              Electronically signed by Marvin Esparza MD at 04/11/25 1152       "

## 2025-04-14 NOTE — TELEPHONE ENCOUNTER
Patient called to request pain medication refill s/p lap sigmoid colectomy & lap hysterectomy with bilateral salpingectomy 4/7/25. Pt was discharged on 4/11. She states she tried taking 1 tab of Percocet every 4 hours along with Tylenol but it was not helping with her pain. She started taking 2 tabs of Percocet every 4 hours and also started incorporating more walks which does seem to help. No nausea, vomiting, fever chills or signs of infection. Advised that Dr. Cramer is out of the office this week but that I would send a message to another doctor in our office.

## 2025-04-14 NOTE — PROGRESS NOTES
Case Management Discharge Note      Final Note: Discharged home. Amanda Galeana RN         Selected Continued Care - Discharged on 4/12/2025 Admission date: 4/7/2025 - Discharge disposition: Home or Self Care         Transportation Services  Private: Car    Final Discharge Disposition Code: 01 - home or self-care

## 2025-04-15 NOTE — DISCHARGE SUMMARY
Date of Discharge:  4/15/2025    Discharge Diagnosis: Hysterectomy with diverticular disease of the sigmoid colon    Presenting Problem/History of Present Illness  Active Hospital Problems    Diagnosis  POA    **Pelvic pain [R10.2]  Yes      Resolved Hospital Problems    Diagnosis Date Resolved POA    Diverticulitis large intestine [K57.32] 04/12/2025 Yes          Hospital Course  Patient is a 45 y.o. female presented with pelvic pain in a chronic form and underwent hysterectomy and also had partial colon resection done laparoscopically at the same time due to significant diverticular disease.  Please see separate history and physical and operative summaries for details.  The patient had slow return of bowel function and difficulty with postop pain control.  She had a drain placed intraoperatively that had significant decrease in drain output by postop day 4.  She was passing flatus and ambulating and ultimately deemed stable for discharge home on postop day 5.  She will follow-up with with both her primary GYN provider and the general surgeon in 2 weeks for incision checks.    Procedures Performed    Procedure(s):  TOTAL LAPAROSCOPIC HYSTERECTOMY BILATERAL SALPINGECTOMY  COLON RESECTION LAPAROSCOPIC SIGMOID  -------------------       Consults:   Consults       No orders found from 3/9/2025 to 4/8/2025.            Pertinent Test Results: labs: Postop hemoglobin was stable at 12.1    Condition on Discharge: Stable and improving    Vital Signs       Physical Exam:   General Appearance: alert, appears stated age, and cooperative  Abdomen: normal bowel sounds, no masses, no hepatomegaly, no splenomegaly, soft non-tender, no guarding, no rebound tenderness, and incisions are all intact with no evidence of separation or infection  Psych: normal    Discharge Disposition  Home or Self Care    Discharge Medications     Discharge Medications        New Medications        Instructions Start Date   ondansetron ODT 4 MG  disintegrating tablet  Commonly known as: ZOFRAN-ODT   4 mg, Oral, Every 6 Hours PRN             Changes to Medications        Instructions Start Date   sennosides-docusate 8.6-50 MG per tablet  Commonly known as: PERICOLACE  What changed: See the new instructions.   2 tablets, Oral, Daily PRN             Continue These Medications        Instructions Start Date   acetaminophen 650 MG 8 hr tablet  Commonly known as: TYLENOL   1 tablet, 3 times daily      amphetamine-dextroamphetamine XR 30 MG 24 hr capsule  Commonly known as: ADDERALL XR   30 mg, 2 Times Daily      clonazePAM 1 MG tablet  Commonly known as: KlonoPIN   TK 1 T PO BID PRN      dicyclomine 20 MG tablet  Commonly known as: BENTYL   20 mg, Oral, Every 8 Hours PRN      docusate sodium 100 MG capsule  Commonly known as: COLACE   100 mg, Oral, 2 Times Daily PRN      lamoTRIgine 200 MG tablet  Commonly known as: LaMICtal   TK 2 TS QAM AND 1 T PO QHS.      Magnesium Oxide -Mg Supplement 400 (240 Mg) MG tablet   TAKE 1 TABLET BY MOUTH TWICE DAILY FOR MIGRAINE PREVENTION      OLANZapine 5 MG tablet  Commonly known as: zyPREXA   5 mg, Nightly      polyethylene glycol 17 GM/SCOOP powder  Commonly known as: MIRALAX   17 g, Oral, Daily      Retin-A 0.05 % cream  Generic drug: tretinoin   APPLY A PEA SIZE AMOUNT TO FACE AT BEDTIME AS TOLERATED      rizatriptan MLT 10 MG disintegrating tablet  Commonly known as: MAXALT-MLT   Place 1 tablet on the tongue 1 (One) Time As Needed.      venlafaxine  MG 24 hr capsule  Commonly known as: EFFEXOR-XR   150 mg, 2 Times Daily             Stop These Medications      HYDROcodone-acetaminophen 5-325 MG per tablet  Commonly known as: NORCO     traMADol HCl 25 MG tablet              Discharge Diet:     Activity at Discharge:     Follow-up Appointments  Future Appointments   Date Time Provider Department Center   5/23/2025 11:00 AM Ross Cramer MD [facility]         Test Results Pending at Discharge        Marvin Esparza MD  04/15/25  13:19 EDT    Time:

## 2025-04-17 ENCOUNTER — READMISSION MANAGEMENT (OUTPATIENT)
Dept: CALL CENTER | Facility: HOSPITAL | Age: 45
End: 2025-04-17
Payer: COMMERCIAL

## 2025-04-17 NOTE — OUTREACH NOTE
Medical Week 1 Survey      Flowsheet Row Responses   [facility] patient discharged from? [place]   Does the patient have one of the following disease processes/diagnoses(primary or secondary)? Other   Week 1 attempt successful? No   Unsuccessful attempts Attempt 1            HOWARD BENÍTEZ - Registered Nurse

## 2025-04-22 ENCOUNTER — READMISSION MANAGEMENT (OUTPATIENT)
Dept: CALL CENTER | Facility: HOSPITAL | Age: 45
End: 2025-04-22
Payer: COMMERCIAL

## 2025-04-22 NOTE — OUTREACH NOTE
Medical Week 1 Survey      Flowsheet Row Responses   [facility] patient discharged from? [place]   Does the patient have one of the following disease processes/diagnoses(primary or secondary)? Other   Week 1 attempt successful? No   Unsuccessful attempts Attempt 2            Haily Hester Registered Nurse

## 2025-04-23 NOTE — PROGRESS NOTES
"Cc: Postoperative follow-up    S: Patient is a very pleasant 45-year-old female that underwent laparoscopic hysterectomy and bilateral salpingo-oophorectomy that required laparoscopic sigmoid colectomy that was done by me emergently on 4/12/2025.  Patient reports having 2-3 bowel movements a day.  Has been taking only stool softener.  Has not been taking any flax at this.    O:   Vitals:    04/24/25 1450   BP: 112/72   Pulse: 76   SpO2: 98%   Weight: 102 kg (224 lb 9.6 oz)   Height: 175.3 cm (69.02\")      Alert, no acute distress  Breathing comfortable  Regular rate rhythm  Abdomen soft, incisions healing well.  Infraumbilical scar with area of scab.  No drainage or signs of infection    Pathology report:   inal Diagnosis   1.  Uterus, cervix, bilateral fallopian tubes, hysterectomy and bilateral salpingectomy (199 g):               - Benign proliferative phase endometrium.               - Benign endo-and ectocervix, negative for dysplasia.               - Bilateral benign fimbriated fallopian tubes with paratubal cyst.     2.  Sigmoid colon, segmental resection:               - Diverticulitis and diverticulosis with organizing peritonitis and adhesions.               - Foreign body giant cells suggestive of remote perforation.               - Viable margins of resection with serositis.               - Negative for malignancy.     3.  Rectum, annular excision:               - Benign colonic mucosa with focal hyperplastic change.     4.  Proximal colon, annular excision:               - Benign colonic mucosa with congestion and reactive changes.       Assessment and plan    45-year-old female status post laparoscopic sigmoid colectomy.  She is doing extremely well and having great bowel function.  Never had a colonoscopy.  Discussed with the patient about the need to have colonoscopy 2 months from her surgery.  I would recommend continue high-fiber diet.  No heavy lifting for the next 6 weeks  Patient verbalized " understanding and agreed with the plan    Ross Cramer MD, FACS  General, Minimally Invasive and Endoscopic Surgery  [facility]    [address], [address]  [place], [place], [address]  P: [phone]  F: [phone]

## 2025-04-24 ENCOUNTER — OFFICE VISIT (OUTPATIENT)
Dept: SURGERY | Facility: CLINIC | Age: 45
End: 2025-04-24
Payer: COMMERCIAL

## 2025-04-24 VITALS
WEIGHT: 224.6 LBS | BODY MASS INDEX: 33.27 KG/M2 | HEIGHT: 69 IN | DIASTOLIC BLOOD PRESSURE: 72 MMHG | HEART RATE: 76 BPM | SYSTOLIC BLOOD PRESSURE: 112 MMHG | OXYGEN SATURATION: 98 %

## 2025-04-24 DIAGNOSIS — Z12.11 SCREENING FOR COLON CANCER: Primary | ICD-10-CM

## 2025-04-24 PROCEDURE — 1159F MED LIST DOCD IN RCRD: CPT | Performed by: SURGERY

## 2025-04-24 PROCEDURE — 99024 POSTOP FOLLOW-UP VISIT: CPT | Performed by: SURGERY

## 2025-04-24 PROCEDURE — 1160F RVW MEDS BY RX/DR IN RCRD: CPT | Performed by: SURGERY

## 2025-04-24 RX ORDER — TRAMADOL HYDROCHLORIDE 25 MG/1
TABLET, COATED ORAL
COMMUNITY

## 2025-04-29 ENCOUNTER — OFFICE VISIT (OUTPATIENT)
Dept: OBSTETRICS AND GYNECOLOGY | Facility: CLINIC | Age: 45
End: 2025-04-29
Payer: COMMERCIAL

## 2025-04-29 ENCOUNTER — TELEPHONE (OUTPATIENT)
Dept: OBSTETRICS AND GYNECOLOGY | Facility: CLINIC | Age: 45
End: 2025-04-29

## 2025-04-29 VITALS
BODY MASS INDEX: 33.33 KG/M2 | WEIGHT: 225 LBS | SYSTOLIC BLOOD PRESSURE: 110 MMHG | DIASTOLIC BLOOD PRESSURE: 77 MMHG | HEIGHT: 69 IN

## 2025-04-29 DIAGNOSIS — R39.9 UTI SYMPTOMS: ICD-10-CM

## 2025-04-29 DIAGNOSIS — Z09 POSTOP CHECK: Primary | ICD-10-CM

## 2025-04-29 LAB
BILIRUB BLD-MCNC: NEGATIVE MG/DL
EXPIRATION DATE: ABNORMAL
GLUCOSE UR STRIP-MCNC: NEGATIVE MG/DL
KETONES UR QL: NEGATIVE
LEUKOCYTE EST, POC: ABNORMAL
Lab: ABNORMAL
NITRITE UR-MCNC: NEGATIVE MG/ML
PH UR: 6 [PH] (ref 5–8)
PROT UR STRIP-MCNC: NEGATIVE MG/DL
RBC # UR STRIP: ABNORMAL /UL
SP GR UR: 1.03 (ref 1–1.03)
UROBILINOGEN UR QL: ABNORMAL

## 2025-04-29 PROCEDURE — 99024 POSTOP FOLLOW-UP VISIT: CPT | Performed by: OBSTETRICS & GYNECOLOGY

## 2025-04-29 PROCEDURE — 81003 URINALYSIS AUTO W/O SCOPE: CPT | Performed by: OBSTETRICS & GYNECOLOGY

## 2025-04-29 RX ORDER — SULFAMETHOXAZOLE AND TRIMETHOPRIM 800; 160 MG/1; MG/1
1 TABLET ORAL 2 TIMES DAILY
Qty: 6 TABLET | Refills: 0 | Status: SHIPPED | OUTPATIENT
Start: 2025-04-29

## 2025-04-29 NOTE — PROGRESS NOTES
Subjective   Deedee Lang is a 45 y.o. female.     Cc:  Postop check    History of Present Illness - Patient is a 45 year old female who underwent laparoscopic hysterectomy.  Intraoperatively, she was noted to have significant adhesions of the large bowel to the corpus of the uterus.  General surgery dissected bowel from uterus, which was likely from a ruptured diverticulum.  Patient had a perforation of the large bowel and also underwent partial colectomy with reanastomosis.  She did well ultimately and was discharged home after 5 days in hospital.  She reports light bleeding.  She has return of bowel function and is urinating normally.  Patient reports some UTI symptoms.    The following portions of the patient's history were reviewed and updated as appropriate: She  has a past medical history of Abnormal Pap smear of cervix, ADHD, Anxiety, Bipolar disorder, COVID-19 (11/2021), Depression, Disease of thyroid gland, Diverticulitis, PMDD (premenstrual dysphoric disorder), and Sjogren's syndrome.  She  reports that she has never smoked. She has never used smokeless tobacco. She reports that she does not currently use alcohol. She reports that she does not use drugs.  Current Outpatient Medications   Medication Sig Dispense Refill    acetaminophen (TYLENOL) 650 MG 8 hr tablet Take 1 tablet by mouth 3 times a day.      amphetamine-dextroamphetamine XR (ADDERALL XR) 30 MG 24 hr capsule Take 1 capsule by mouth 2 (Two) Times a Day HOLD PER MD PENNY      clonazePAM (KlonoPIN) 1 MG tablet TK 1 T PO BID PRN  0    docusate sodium (COLACE) 100 MG capsule Take 1 capsule by mouth 2 (Two) Times a Day As Needed for Constipation for up to 30 doses. 30 capsule 0    lamoTRIgine (LaMICtal) 200 MG tablet TK 2 TS QAM AND 1 T PO QHS.  1    Magnesium Oxide -Mg Supplement 400 (240 Mg) MG tablet TAKE 1 TABLET BY MOUTH TWICE DAILY FOR MIGRAINE PREVENTION      OLANZapine (zyPREXA) 5 MG tablet Take 1 tablet by mouth Every Night.       ondansetron ODT (ZOFRAN-ODT) 4 MG disintegrating tablet Take 1 tablet by mouth Every 6 (Six) Hours As Needed for Nausea or Vomiting. 30 tablet 1    Retin-A 0.05 % cream APPLY A PEA SIZE AMOUNT TO FACE AT BEDTIME AS TOLERATED      rizatriptan MLT (MAXALT-MLT) 10 MG disintegrating tablet Place 1 tablet on the tongue 1 (One) Time As Needed.      sennosides-docusate (PERICOLACE) 8.6-50 MG per tablet Take 2 tablets by mouth Daily As Needed for Constipation. 30 tablet 1    traMADol HCl 25 MG tablet Take  by mouth.      venlafaxine XR (EFFEXOR-XR) 150 MG 24 hr capsule Take 1 capsule by mouth 2 (Two) Times a Day.       No current facility-administered medications for this visit.     She is allergic to codeine and gluten meal..    Review of Systems   Constitutional:  Negative for chills and fever.   Gastrointestinal:  Negative for nausea and vomiting.   Genitourinary:  Negative for dysuria and frequency.       Objective   Physical Exam  Vitals reviewed. Exam conducted with a chaperone present.   Constitutional:       Appearance: Normal appearance.   Abdominal:      Tenderness: There is no abdominal tenderness. There is no guarding or rebound.   Genitourinary:     General: Normal vulva.      Exam position: Lithotomy position.      Comments: Vaginal cuff healing well.  No bleeding noted.  Neurological:      Mental Status: She is alert.   Psychiatric:         Mood and Affect: Mood normal.         Behavior: Behavior normal.         Thought Content: Thought content normal.         Judgment: Judgment normal.         Assessment & Plan   Diagnoses and all orders for this visit:    1. Postop check (Primary)        -      Reassurance given.  Restrictions reviewed.  Follow up in 4 weeks.  2. UTI symptoms  -     Urine Culture - Urine, Urine, Clean Catch  -     POC Urinalysis Dipstick, Automated  -     Dipstick nonspecific but will start antibiotics until culture returns.  Bactrim started.    Percy Mcintyre MD

## 2025-04-30 ENCOUNTER — READMISSION MANAGEMENT (OUTPATIENT)
Dept: CALL CENTER | Facility: HOSPITAL | Age: 45
End: 2025-04-30
Payer: COMMERCIAL

## 2025-04-30 NOTE — TELEPHONE ENCOUNTER
Left message for Deedee that Dr Mcintyre said she has a UTI. He sent in Bactrim abx to your [facility] at [address]. We hope you will feel better soon.

## 2025-04-30 NOTE — OUTREACH NOTE
Medical Week 3 Survey      Flowsheet Row Responses   [facility] patient discharged from? [place]   Does the patient have one of the following disease processes/diagnoses(primary or secondary)? Other   Week 3 attempt successful? No   Unsuccessful attempts Attempt 1            CADEN RICHEY - Registered Nurse

## 2025-05-01 ENCOUNTER — TELEPHONE (OUTPATIENT)
Dept: OBSTETRICS AND GYNECOLOGY | Facility: CLINIC | Age: 45
End: 2025-05-01
Payer: COMMERCIAL

## 2025-05-01 LAB
BACTERIA UR CULT: NO GROWTH
BACTERIA UR CULT: NORMAL

## 2025-05-05 ENCOUNTER — READMISSION MANAGEMENT (OUTPATIENT)
Dept: CALL CENTER | Facility: HOSPITAL | Age: 45
End: 2025-05-05
Payer: COMMERCIAL

## 2025-05-05 NOTE — OUTREACH NOTE
Medical Week 3 Survey      Flowsheet Row Responses   [facility] patient discharged from? [place]   Does the patient have one of the following disease processes/diagnoses(primary or secondary)? Other   Week 3 attempt successful? Yes   Call start time 1639   Call end time 1640   Discharge diagnosis Pelvic pain   Meds reviewed with patient/caregiver? Yes   Is the patient having any side effects they believe may be caused by any medication additions or changes? No   Does the patient have all medications ordered at discharge? Yes   Is the patient taking all medications as directed (includes completed medication regime)? Yes   Does the patient have a primary care provider?  Yes   Does the patient have an appointment with their PCP within 7 days of discharge? Yes   Has the patient kept scheduled appointments due by today? Yes   Psychosocial issues? No   Did the patient receive a copy of their discharge instructions? Yes   Nursing interventions Reviewed instructions with patient   What is the patient's perception of their health status since discharge? Improving   Is the patient/caregiver able to teach back signs and symptoms related to disease process for when to call PCP? Yes   Is the patient/caregiver able to teach back signs and symptoms related to disease process for when to call 911? Yes   Is the patient/caregiver able to teach back the hierarchy of who to call/visit for symptoms/problems? PCP, Specialist, Home health nurse, Urgent Care, ED, 911 Yes   Week 3 Call Completed? Yes   Graduated Yes   Is the patient interested in additional calls from an ambulatory ? No   Would this patient benefit from a Referral to Amb Social Work? No   Call end time 1640            Avi MELENDEZ - Registered Nurse

## 2025-05-27 ENCOUNTER — OFFICE VISIT (OUTPATIENT)
Dept: OBSTETRICS AND GYNECOLOGY | Facility: CLINIC | Age: 45
End: 2025-05-27
Payer: COMMERCIAL

## 2025-05-27 VITALS
HEIGHT: 69 IN | BODY MASS INDEX: 34.36 KG/M2 | DIASTOLIC BLOOD PRESSURE: 78 MMHG | WEIGHT: 232 LBS | SYSTOLIC BLOOD PRESSURE: 120 MMHG

## 2025-05-27 DIAGNOSIS — Z98.890 POST-OPERATIVE STATE: Primary | ICD-10-CM

## 2025-05-27 RX ORDER — ESTRADIOL 10 UG/1
10 TABLET, FILM COATED VAGINAL 2 TIMES WEEKLY
COMMUNITY
Start: 2025-05-08 | End: 2026-05-08

## 2025-05-27 RX ORDER — DEXTROAMPHETAMINE SACCHARATE, AMPHETAMINE ASPARTATE, DEXTROAMPHETAMINE SULFATE AND AMPHETAMINE SULFATE 7.5; 7.5; 7.5; 7.5 MG/1; MG/1; MG/1; MG/1
TABLET ORAL
COMMUNITY
Start: 2025-05-15

## 2025-05-28 NOTE — PROGRESS NOTES
Subjective   Deedee Lang is a 45 y.o. female.     Cc:  Postop check    History of Present Illness - Patient is a 45 year old female who presents for postoperative check.  She underwent total laparoscopic hysterectomy, bilateral salpingectomy and partial colon resection for ruptured diverticulum.  She feels well but reports vaginal dryness and decreased libido.  Notably, she has not been cleared for sexual activity but has been active nonetheless.  She went to see her PCP and had work up with FSH and hormone levels.  Her FSH level was normal but hormones were low.  Her hysterectomy did not involve removal of ovaries.  She was placed on Vagifem.  She reports normal bowel and bladder function.  Her next follow up with general surgery is in 2 months.    The following portions of the patient's history were reviewed and updated as appropriate: She  has a past medical history of Abnormal Pap smear of cervix, ADHD, Anxiety, Bipolar disorder, COVID-19 (11/2021), Depression, Disease of thyroid gland, Diverticulitis, PMDD (premenstrual dysphoric disorder), and Sjogren's syndrome.  She  reports that she has never smoked. She has never used smokeless tobacco. She reports that she does not currently use alcohol. She reports that she does not use drugs.  Current Outpatient Medications   Medication Sig Dispense Refill    acetaminophen (TYLENOL) 650 MG 8 hr tablet Take 1 tablet by mouth 3 times a day.      amphetamine-dextroamphetamine (ADDERALL) 30 MG tablet       amphetamine-dextroamphetamine XR (ADDERALL XR) 30 MG 24 hr capsule Take 1 capsule by mouth 2 (Two) Times a Day HOLD PER MD PENNY      clonazePAM (KlonoPIN) 1 MG tablet TK 1 T PO BID PRN  0    docusate sodium (COLACE) 100 MG capsule Take 1 capsule by mouth 2 (Two) Times a Day As Needed for Constipation for up to 30 doses. 30 capsule 0    estradiol (VAGIFEM) 10 MCG tablet vaginal tablet Insert 1 tablet into the vagina 2 (Two) Times a Week.      lamoTRIgine (LaMICtal)  200 MG tablet TK 2 TS QAM AND 1 T PO QHS.  1    Magnesium Oxide -Mg Supplement 400 (240 Mg) MG tablet TAKE 1 TABLET BY MOUTH TWICE DAILY FOR MIGRAINE PREVENTION      OLANZapine (zyPREXA) 5 MG tablet Take 1 tablet by mouth Every Night.      ondansetron ODT (ZOFRAN-ODT) 4 MG disintegrating tablet Take 1 tablet by mouth Every 6 (Six) Hours As Needed for Nausea or Vomiting. 30 tablet 1    Retin-A 0.05 % cream APPLY A PEA SIZE AMOUNT TO FACE AT BEDTIME AS TOLERATED      rizatriptan MLT (MAXALT-MLT) 10 MG disintegrating tablet Place 1 tablet on the tongue 1 (One) Time As Needed.      sennosides-docusate (PERICOLACE) 8.6-50 MG per tablet Take 2 tablets by mouth Daily As Needed for Constipation. 30 tablet 1    venlafaxine XR (EFFEXOR-XR) 150 MG 24 hr capsule Take 1 capsule by mouth 2 (Two) Times a Day.       No current facility-administered medications for this visit.     She is allergic to codeine and gluten meal..    Review of Systems   Constitutional:  Negative for chills and fever.   Gastrointestinal:  Negative for nausea and vomiting.   Genitourinary:  Negative for dysuria, frequency, vaginal bleeding and vaginal discharge.       Objective   Physical Exam  Vitals reviewed. Exam conducted with a chaperone present.   Constitutional:       Appearance: Normal appearance.   Abdominal:      Tenderness: There is no guarding or rebound.      Comments: Incisions healing well.   Genitourinary:     General: Normal vulva.      Labia:         Right: No rash or tenderness.         Left: No rash or tenderness.       Vagina: Normal.      Uterus: Absent.       Adnexa:         Right: No fullness.          Left: No fullness.     Neurological:      Mental Status: She is alert.         Assessment & Plan      Postoperative Check  - Patient had a large surgical endeavor with extended recovery.  Some symptoms may not be organic and require further observation.  If libido and dryness are still issue in 2 months, consider repeat hormone  testing.  For now, patient to have realistic expectations in the early postoperative window.    Percy Mcintyre MD

## 2025-07-22 ENCOUNTER — ANESTHESIA EVENT (OUTPATIENT)
Dept: GASTROENTEROLOGY | Facility: HOSPITAL | Age: 45
End: 2025-07-22
Payer: COMMERCIAL

## 2025-07-22 ENCOUNTER — HOSPITAL ENCOUNTER (OUTPATIENT)
Facility: HOSPITAL | Age: 45
Setting detail: HOSPITAL OUTPATIENT SURGERY
Discharge: HOME | End: 2025-07-22
Attending: SURGERY | Admitting: SURGERY
Payer: COMMERCIAL

## 2025-07-22 ENCOUNTER — ANESTHESIA (OUTPATIENT)
Dept: GASTROENTEROLOGY | Facility: HOSPITAL | Age: 45
End: 2025-07-22
Payer: COMMERCIAL

## 2025-07-22 VITALS
OXYGEN SATURATION: 98 % | HEART RATE: 60 BPM | RESPIRATION RATE: 16 BRPM | DIASTOLIC BLOOD PRESSURE: 89 MMHG | HEIGHT: 69 IN | WEIGHT: 233.1 LBS | SYSTOLIC BLOOD PRESSURE: 136 MMHG | BODY MASS INDEX: 34.52 KG/M2

## 2025-07-22 DIAGNOSIS — R10.13 EPIGASTRIC PAIN: Primary | ICD-10-CM

## 2025-07-22 PROBLEM — Z12.11 SCREENING FOR COLON CANCER: Status: RESOLVED | Noted: 2025-04-24 | Resolved: 2025-07-22

## 2025-07-22 PROCEDURE — 25010000002 LIDOCAINE 2% SOLUTION: Performed by: NURSE ANESTHETIST, CERTIFIED REGISTERED

## 2025-07-22 PROCEDURE — 25810000003 LACTATED RINGERS PER 1000 ML: Performed by: SURGERY

## 2025-07-22 PROCEDURE — 25010000002 PROPOFOL 10 MG/ML EMULSION: Performed by: NURSE ANESTHETIST, CERTIFIED REGISTERED

## 2025-07-22 PROCEDURE — S0260 H&P FOR SURGERY: HCPCS | Performed by: SURGERY

## 2025-07-22 RX ORDER — SODIUM CHLORIDE 0.9 % (FLUSH) 0.9 %
10 SYRINGE (ML) INJECTION AS NEEDED
Status: DISCONTINUED | OUTPATIENT
Start: 2025-07-22 | End: 2025-07-22 | Stop reason: HOSPADM

## 2025-07-22 RX ORDER — FLUMAZENIL 0.1 MG/ML
0.2 INJECTION INTRAVENOUS AS NEEDED
Status: DISCONTINUED | OUTPATIENT
Start: 2025-07-22 | End: 2025-07-22 | Stop reason: HOSPADM

## 2025-07-22 RX ORDER — DROPERIDOL 2.5 MG/ML
0.62 INJECTION, SOLUTION INTRAMUSCULAR; INTRAVENOUS
Status: DISCONTINUED | OUTPATIENT
Start: 2025-07-22 | End: 2025-07-22 | Stop reason: HOSPADM

## 2025-07-22 RX ORDER — PROPOFOL 10 MG/ML
VIAL (ML) INTRAVENOUS AS NEEDED
Status: DISCONTINUED | OUTPATIENT
Start: 2025-07-22 | End: 2025-07-22 | Stop reason: SURG

## 2025-07-22 RX ORDER — ONDANSETRON 2 MG/ML
4 INJECTION INTRAMUSCULAR; INTRAVENOUS ONCE AS NEEDED
Status: DISCONTINUED | OUTPATIENT
Start: 2025-07-22 | End: 2025-07-22 | Stop reason: HOSPADM

## 2025-07-22 RX ORDER — NALOXONE HCL 0.4 MG/ML
0.2 VIAL (ML) INJECTION AS NEEDED
Status: DISCONTINUED | OUTPATIENT
Start: 2025-07-22 | End: 2025-07-22 | Stop reason: HOSPADM

## 2025-07-22 RX ORDER — LIDOCAINE HYDROCHLORIDE 10 MG/ML
0.5 INJECTION, SOLUTION INFILTRATION; PERINEURAL ONCE AS NEEDED
Status: DISCONTINUED | OUTPATIENT
Start: 2025-07-22 | End: 2025-07-22 | Stop reason: HOSPADM

## 2025-07-22 RX ORDER — SODIUM CHLORIDE, SODIUM LACTATE, POTASSIUM CHLORIDE, CALCIUM CHLORIDE 600; 310; 30; 20 MG/100ML; MG/100ML; MG/100ML; MG/100ML
1000 INJECTION, SOLUTION INTRAVENOUS CONTINUOUS
Status: DISCONTINUED | OUTPATIENT
Start: 2025-07-22 | End: 2025-07-22 | Stop reason: HOSPADM

## 2025-07-22 RX ORDER — PROMETHAZINE HYDROCHLORIDE 25 MG/1
25 TABLET ORAL ONCE AS NEEDED
Status: DISCONTINUED | OUTPATIENT
Start: 2025-07-22 | End: 2025-07-22 | Stop reason: HOSPADM

## 2025-07-22 RX ORDER — LIDOCAINE HYDROCHLORIDE 20 MG/ML
INJECTION, SOLUTION INFILTRATION; PERINEURAL AS NEEDED
Status: DISCONTINUED | OUTPATIENT
Start: 2025-07-22 | End: 2025-07-22 | Stop reason: SURG

## 2025-07-22 RX ORDER — DIPHENHYDRAMINE HYDROCHLORIDE 50 MG/ML
12.5 INJECTION, SOLUTION INTRAMUSCULAR; INTRAVENOUS
Status: DISCONTINUED | OUTPATIENT
Start: 2025-07-22 | End: 2025-07-22 | Stop reason: HOSPADM

## 2025-07-22 RX ORDER — PROMETHAZINE HYDROCHLORIDE 25 MG/1
25 SUPPOSITORY RECTAL ONCE AS NEEDED
Status: DISCONTINUED | OUTPATIENT
Start: 2025-07-22 | End: 2025-07-22 | Stop reason: HOSPADM

## 2025-07-22 RX ADMIN — PROPOFOL 100 MG: 10 INJECTION, EMULSION INTRAVENOUS at 08:06

## 2025-07-22 RX ADMIN — PROPOFOL 100 MG: 10 INJECTION, EMULSION INTRAVENOUS at 08:19

## 2025-07-22 RX ADMIN — LIDOCAINE HYDROCHLORIDE 60 MG: 20 INJECTION, SOLUTION INFILTRATION; PERINEURAL at 08:06

## 2025-07-22 RX ADMIN — PROPOFOL 100 MG: 10 INJECTION, EMULSION INTRAVENOUS at 08:12

## 2025-07-22 RX ADMIN — PROPOFOL 50 MG: 10 INJECTION, EMULSION INTRAVENOUS at 08:15

## 2025-07-22 RX ADMIN — SODIUM CHLORIDE, POTASSIUM CHLORIDE, SODIUM LACTATE AND CALCIUM CHLORIDE 1000 ML: 600; 310; 30; 20 INJECTION, SOLUTION INTRAVENOUS at 07:36

## 2025-07-22 NOTE — ANESTHESIA POSTPROCEDURE EVALUATION
"Patient: Deedee Lang    Procedure Summary       Date: 07/22/25 Room / Location:  [facility] /  [facility]    Anesthesia Start: 0801 Anesthesia Stop: 0826    Procedure: COLONOSCOPY TO CECUM AND TERM. ILEUM Diagnosis:       Screening for colon cancer      (Screening for colon cancer [Z12.11])    Surgeons: Ross Cramer MD Provider: Storm Jean MD    Anesthesia Type: MAC ASA Status: 2            Anesthesia Type: MAC    Vitals  Vitals Value Taken Time   /89 07/22/25 08:55   Temp     Pulse 59 07/22/25 08:59   Resp 16 07/22/25 08:49   SpO2 98 % 07/22/25 08:59   Vitals shown include unfiled device data.        Post Anesthesia Care and Evaluation    Patient location during evaluation: bedside  Patient participation: complete - patient participated  Level of consciousness: sleepy but conscious  Pain score: 0  Pain management: adequate    Airway patency: patent  Anesthetic complications: No anesthetic complications    Cardiovascular status: acceptable  Respiratory status: acceptable  Hydration status: acceptable    Comments: /89   Pulse 60   Resp 16   Ht 175.3 cm (69\")   Wt 106 kg (233 lb 1.6 oz)   LMP 03/31/2025 (Exact Date)   SpO2 98%   BMI 34.42 kg/m²       "

## 2025-07-22 NOTE — ANESTHESIA PREPROCEDURE EVALUATION
Anesthesia Evaluation     Patient summary reviewed and Nursing notes reviewed   NPO Solid Status: > 8 hours  NPO Liquid Status: > 4 hours           Airway   Mallampati: II  Neck ROM: full  No difficulty expected  Dental - normal exam     Pulmonary     breath sounds clear to auscultation  Cardiovascular     Rhythm: regular        Neuro/Psych  (+) psychiatric history Depression, Bipolar and Anxiety  GI/Hepatic/Renal/Endo    (+) obesity, thyroid problem     Musculoskeletal     Abdominal   (+) obese   Substance History      OB/GYN          Other          Other Comment: Sjogrens syndrome              Anesthesia Plan    ASA 2     MAC     intravenous induction     Anesthetic plan, risks, benefits, and alternatives have been provided, discussed and informed consent has been obtained with: patient.    CODE STATUS:

## 2025-07-22 NOTE — H&P
Subjective:   Reason for Visit: Screening colonoscopy    HPI:  Patient presents for evaluation for colon cancer screening.  There is a family history of colon cancer. Her paternal grandmother had colon cancer in her 70'S.   Has hx of sigmoid colectomy for diverticulitis on 2025  Patient denies changes in bowel habits, diarrhea, constipation, decreased caliber of stool, melena, brbpr, and weight loss.  Patient reports abdominal pain that happens after eating associated with nausea  There is no personal or family history of bleeding disorder or untoward reaction to anesthesia.  Patient has never had a colonoscopy.      Past Medical History:   Diagnosis Date    Abnormal Pap smear of cervix     ADHD     Anxiety     Bipolar disorder     COVID-19 2021    Depression     Disease of thyroid gland     Diverticulitis     PMDD (premenstrual dysphoric disorder)     Sjogren's syndrome        Past Surgical History:   Procedure Laterality Date    APPENDECTOMY      BREAST AUGMENTATION       SECTION      COLON RESECTION N/A 2025    Procedure: COLON RESECTION LAPAROSCOPIC SIGMOID;  Surgeon: Ross Cramer MD;  Location: [facility];  Service: General;  Laterality: N/A;    D & C HYSTEROSCOPY ENDOMETRIAL ABLATION N/A 2023    Procedure: DILATATION AND CURETTAGE HYSTEROSCOPY NOVASURE ENDOMETRIAL ABLATION;  Surgeon: Percy Mcintyre MD;  Location: [facility];  Service: Obstetrics/Gynecology;  Laterality: N/A;    TOTAL LAPAROSCOPIC HYSTERECTOMY Bilateral 2025    Procedure: TOTAL LAPAROSCOPIC HYSTERECTOMY BILATERAL SALPINGECTOMY;  Surgeon: Percy Mcintyre MD;  Location: [facility];  Service: Obstetrics/Gynecology;  Laterality: Bilateral;    TUBAL ABDOMINAL LIGATION           Current Facility-Administered Medications:     lactated ringers infusion 1,000 mL, 1,000 mL, Intravenous, Continuous, Ross Cramer MD, Last Rate: 25 mL/hr at 25 0736, 1,000 mL at  "07/22/25 0736    lidocaine (XYLOCAINE) 1 % injection 0.5 mL, 0.5 mL, Intradermal, Once PRN, Ross Cramer MD    sodium chloride 0.9 % flush 10 mL, 10 mL, Intravenous, PRN, Ross Cramer MD    Allergies   Allergen Reactions    Gluten Meal Other (See Comments)     BLOATING AND DIARRHEA       Family History   Problem Relation Age of Onset    Depression Mother     Dystonia Mother     Diabetes Father     Depression Father     Muscular dystrophy Maternal Grandmother     Alzheimer's disease Maternal Grandmother     Muscular dystrophy Maternal Grandfather     Colon cancer Paternal Grandmother     Bone cancer Paternal Grandfather     Malig Hyperthermia Neg Hx        Social History     Socioeconomic History    Marital status: Single   Tobacco Use    Smoking status: Never    Smokeless tobacco: Never   Vaping Use    Vaping status: Never Used   Substance and Sexual Activity    Alcohol use: Not Currently    Drug use: Never    Sexual activity: Yes     Birth control/protection: None, Tubal ligation        Review Of Systems:  A comprehensive review of systems was negative.    Objective:   Physical exam:  /94 (BP Location: Left arm, Patient Position: Lying)   Pulse 70   Resp 16   Ht 175.3 cm (69\")   Wt 106 kg (233 lb 1.6 oz)   LMP 03/31/2025 (Exact Date)   SpO2 97%   BMI 34.42 kg/m²     General Appearance:  awake, alert, oriented, in no acute distress  Lungs:  Breathing Pattern: regular, no distress  Heart:  Heart sounds are normal.  Regular rate and rhythm without murmur, gallop or rub.  Abdomen:  Soft, non-tender, normal bowel sounds; no bruits, organomegaly or masses.    Assessment:    Deedee Lang is a 45 y.o. female who presents for evaluation for colon cancer screening. He has hx of recent sigmoid colectomy for divericulitis    Patient has increased risk factors or warning signs or symptoms.  I reviewed current ACG guidelines for colon cancer screening:    A)  Flex sig q 5yrs with " yearly fecal occult blood testing  B)  Virtual colonoscopy  C)  Colonoscopy with possible biopsy/polypectomy with IV sedation at appropriate       screening intervals    The patient has a family history of colon cancer.  She  has no personal history of colon polyp who presents for colon cancer screening evaluation.   I would advise a colonscopy.     The rationale for each option as well as all risks and benefits of each alternative were discussed with the patient in detail.  The patient understands and does wish to proceed with Colonoscopy Screening      I will order us gallbladder to work up her pain, she understood      The rationale for colonoscopy with possible biopsy, possible polypectomy, with IV sedation as well as all risks, benefits, and alternatives were discussed with the patient in detail. Risks including but not limited to perforation, bleeding,need for blood transfusion or emergent surgery ,and missed neoplasm were reviewed in detail with the patient The patient demonstrates full understanding and wishes to proceed with the colonoscopy.

## 2025-07-22 NOTE — DISCHARGE INSTRUCTIONS
For the next 24 hours patient needs to be with a responsible adult.    For 24 hours DO NOT drive, operate machinery, appliances, drink alcohol, make important decisions or sign legal documents.    Start with a light or bland diet if you are feeling sick to your stomach otherwise advance to regular diet as tolerated.    Follow recommendations on procedure report if provided by your doctor.    Call Dr Cramer for problems [phone]    Problems may include but not limited to: large amounts of bleeding, trouble breathing, repeated vomiting, severe unrelieved pain, fever or chills.

## 2025-08-11 ENCOUNTER — HOSPITAL ENCOUNTER (OUTPATIENT)
Facility: HOSPITAL | Age: 45
Discharge: HOME | End: 2025-08-11
Admitting: SURGERY
Payer: COMMERCIAL

## 2025-08-11 DIAGNOSIS — R10.13 EPIGASTRIC PAIN: ICD-10-CM

## 2025-08-11 PROCEDURE — 76705 ECHO EXAM OF ABDOMEN: CPT

## 2025-08-19 ENCOUNTER — TELEPHONE (OUTPATIENT)
Dept: SURGERY | Facility: CLINIC | Age: 45
End: 2025-08-19
Payer: COMMERCIAL

## 2025-08-19 RX ORDER — POLYETHYLENE GLYCOL 3350 17 G/17G
17 POWDER, FOR SOLUTION ORAL DAILY
Qty: 60 EACH | Refills: 1 | Status: SHIPPED | OUTPATIENT
Start: 2025-08-19

## 2025-08-20 ENCOUNTER — TELEPHONE (OUTPATIENT)
Dept: SURGERY | Facility: CLINIC | Age: 45
End: 2025-08-20
Payer: COMMERCIAL

## 2025-08-29 ENCOUNTER — OFFICE VISIT (OUTPATIENT)
Dept: SURGERY | Facility: CLINIC | Age: 45
End: 2025-08-29
Payer: COMMERCIAL

## 2025-08-29 VITALS — HEIGHT: 69 IN | BODY MASS INDEX: 34.63 KG/M2 | WEIGHT: 233.8 LBS

## 2025-08-29 DIAGNOSIS — K80.20 CALCULUS OF GALLBLADDER WITHOUT CHOLECYSTITIS WITHOUT OBSTRUCTION: Primary | ICD-10-CM

## 2025-08-29 PROCEDURE — 1160F RVW MEDS BY RX/DR IN RCRD: CPT | Performed by: SURGERY

## 2025-08-29 PROCEDURE — 1159F MED LIST DOCD IN RCRD: CPT | Performed by: SURGERY

## 2025-08-29 PROCEDURE — 99214 OFFICE O/P EST MOD 30 MIN: CPT | Performed by: SURGERY

## 2025-08-29 RX ORDER — SODIUM CHLORIDE 9 MG/ML
40 INJECTION, SOLUTION INTRAVENOUS AS NEEDED
OUTPATIENT
Start: 2025-08-29

## 2025-08-29 RX ORDER — SODIUM CHLORIDE 0.9 % (FLUSH) 0.9 %
10 SYRINGE (ML) INJECTION AS NEEDED
OUTPATIENT
Start: 2025-08-29

## 2025-08-29 RX ORDER — PANTOPRAZOLE SODIUM 40 MG/1
40 TABLET, DELAYED RELEASE ORAL DAILY
Qty: 30 TABLET | Refills: 0 | Status: SHIPPED | OUTPATIENT
Start: 2025-08-29 | End: 2025-09-28

## 2025-08-29 RX ORDER — ONDANSETRON 2 MG/ML
4 INJECTION INTRAMUSCULAR; INTRAVENOUS EVERY 6 HOURS PRN
OUTPATIENT
Start: 2025-08-29

## 2025-08-29 RX ORDER — SODIUM CHLORIDE 0.9 % (FLUSH) 0.9 %
10 SYRINGE (ML) INJECTION EVERY 12 HOURS SCHEDULED
OUTPATIENT
Start: 2025-08-29

## 2025-08-29 RX ORDER — INDOCYANINE GREEN AND WATER 25 MG
5 KIT INJECTION ONCE
OUTPATIENT
Start: 2025-08-29 | End: 2025-08-29

## (undated) DEVICE — ECHELON FLEX45 ENDOPATH STAPLER, ARTICULATING ENDOSCOPIC LINEAR CUTTER (NO CARTRIDGE): Brand: ECHELON ENDOPATH

## (undated) DEVICE — 2, DISPOSABLE SUCTION/IRRIGATOR WITH DISPOSABLE TIP: Brand: STRYKEFLOW

## (undated) DEVICE — HARMONIC 700 SHEARS, ADVANCED HEMOSTASIS: Brand: HARMONIC

## (undated) DEVICE — THE STERILE LIGHT HANDLE COVER IS USED WITH STERIS SURGICAL LIGHTING AND VISUALIZATION SYSTEMS.

## (undated) DEVICE — RETRACTABLE L-HOOK LAPAROSCOPIC SEALER/DIVIDER: Brand: LIGASURE

## (undated) DEVICE — ADAPT CLN BIOGUARD AIR/H2O DISP

## (undated) DEVICE — LAPAROSCOPIC SMOKE FILTRATION SYSTEM: Brand: PALL LAPAROSHIELD® PLUS LAPAROSCOPIC SMOKE FILTRATION SYSTEM

## (undated) DEVICE — STRAP STIRUP WO/ RNG

## (undated) DEVICE — ANTIBACTERIAL UNDYED BRAIDED (POLYGLACTIN 910), SYNTHETIC ABSORBABLE SUTURE: Brand: COATED VICRYL

## (undated) DEVICE — COVER,MAYO STAND,STERILE: Brand: MEDLINE

## (undated) DEVICE — LOU D & C HYSTEROSCOPY: Brand: MEDLINE INDUSTRIES, INC.

## (undated) DEVICE — ANTIBACTERIAL UNDYED BRAIDED (POLYGLACTIN 910), SYNTHETIC ABSORBABLE SURGICAL SUTURE: Brand: COATED VICRYL

## (undated) DEVICE — ENDOPATH XCEL DILATING TIP TROCARS WITH STABILITY SLEEVES: Brand: ENDOPATH XCEL

## (undated) DEVICE — 1000ML,PRESSURE INFUSER W/STOPCOCK: Brand: MEDLINE

## (undated) DEVICE — MANIP UTER ADVINCULA DELINEATOR 3.5CM

## (undated) DEVICE — WOUND RETRACTOR AND PROTECTOR: Brand: ALEXIS WOUND PROTECTOR-RETRACTOR

## (undated) DEVICE — GLV SURG BIOGEL LTX PF 7 1/2

## (undated) DEVICE — SUT VIC 0 UR6 27IN VCP603H

## (undated) DEVICE — SOL NACL 0.9PCT 1000ML

## (undated) DEVICE — ENDOPATH XCEL BLADELESS TROCARS WITH STABILITY SLEEVES: Brand: ENDOPATH XCEL

## (undated) DEVICE — PROB ABL ENDOMTRL NOVASURE/G4 W/SURESND

## (undated) DEVICE — SUTURING DEVICE: Brand: ENDO STITCH

## (undated) DEVICE — RESERVOIR,SUCTION,100CC,SILICONE: Brand: MEDLINE

## (undated) DEVICE — TUBING, SUCTION, 1/4" X 10', STRAIGHT: Brand: MEDLINE

## (undated) DEVICE — GLV SURG SENSICARE PI MIC PF SZ8 LF STRL

## (undated) DEVICE — SYR LL TP 10ML STRL

## (undated) DEVICE — ENDOCUT SCISSOR TIP, DISPOSABLE: Brand: RENEW

## (undated) DEVICE — ST IRR CYSTO W/SPK 77IN LF

## (undated) DEVICE — EXOFIN PRECISION PEN HIGH VISCOSITY TOPICAL SKIN ADHESIVE: Brand: EXOFIN PRECISION PEN, 1G

## (undated) DEVICE — INSUFFLATION TUBING SET, WITH GAS FILTER: Brand: N.A.

## (undated) DEVICE — TROCAR SITE CLOSURE DEVICE: Brand: ENDO CLOSE

## (undated) DEVICE — Device

## (undated) DEVICE — CANN O2 ETCO2 FITS ALL CONN CO2 SMPL A/ 7IN DISP LF

## (undated) DEVICE — LOU GYN LAPAROSCOPY: Brand: MEDLINE INDUSTRIES, INC.

## (undated) DEVICE — LN SMPL CO2 SHTRM SD STREAM W/M LUER

## (undated) DEVICE — ENDOPATH XCEL BLUNT TIP TROCARS WITH SMOOTH SLEEVES: Brand: ENDOPATH XCEL

## (undated) DEVICE — SYRINGE, LUER LOCK, 60ML: Brand: MEDLINE

## (undated) DEVICE — KT ORCA ORCAPOD DISP STRL

## (undated) DEVICE — 3M™ STERI-DRAPE™ INSTRUMENT POUCH 1018L: Brand: STERI-DRAPE™

## (undated) DEVICE — IRRIGATOR BULB ASEPTO 60CC STRL

## (undated) DEVICE — SENSR O2 OXIMAX FNGR A/ 18IN NONSTR